# Patient Record
Sex: FEMALE | Race: WHITE | NOT HISPANIC OR LATINO | Employment: FULL TIME | URBAN - METROPOLITAN AREA
[De-identification: names, ages, dates, MRNs, and addresses within clinical notes are randomized per-mention and may not be internally consistent; named-entity substitution may affect disease eponyms.]

---

## 2018-01-24 ENCOUNTER — HISTORICAL (OUTPATIENT)
Dept: ADMINISTRATIVE | Facility: HOSPITAL | Age: 28
End: 2018-01-24

## 2018-01-24 LAB
ALBUMIN SERPL-MCNC: 3.8 GM/DL (ref 3.4–5)
ALBUMIN/GLOB SERPL: 1.1 RATIO (ref 1.1–2)
ALP SERPL-CCNC: 62 UNIT/L (ref 38–126)
ALT SERPL-CCNC: 23 UNIT/L (ref 12–78)
AST SERPL-CCNC: 15 UNIT/L (ref 15–37)
BILIRUB SERPL-MCNC: 0.5 MG/DL (ref 0.2–1)
BILIRUBIN DIRECT+TOT PNL SERPL-MCNC: 0.2 MG/DL (ref 0–0.5)
BILIRUBIN DIRECT+TOT PNL SERPL-MCNC: 0.3 MG/DL (ref 0–0.8)
BUN SERPL-MCNC: 11 MG/DL (ref 7–18)
CALCIUM SERPL-MCNC: 8.3 MG/DL (ref 8.5–10.1)
CHLORIDE SERPL-SCNC: 107 MMOL/L (ref 98–107)
CO2 SERPL-SCNC: 25 MMOL/L (ref 21–32)
CORTIS SERPL-SCNC: 15.7 MCG/DL
CREAT SERPL-MCNC: 0.84 MG/DL (ref 0.55–1.02)
GLOBULIN SER-MCNC: 3.4 GM/DL (ref 2.4–3.5)
GLUCOSE SERPL-MCNC: 100 MG/DL (ref 74–106)
POTASSIUM SERPL-SCNC: 4 MMOL/L (ref 3.5–5.1)
PROT SERPL-MCNC: 7.2 GM/DL (ref 6.4–8.2)
SODIUM SERPL-SCNC: 140 MMOL/L (ref 136–145)
T4 FREE SERPL-MCNC: 0.95 NG/DL (ref 0.76–1.46)
TSH SERPL-ACNC: 1.7 MIU/ML (ref 0.36–3.74)

## 2018-03-23 LAB — RAPID GROUP A STREP (OHS): NEGATIVE

## 2019-10-14 LAB
HCT VFR BLD AUTO: 45.7 % (ref 37–47)
HGB BLD-MCNC: 14.4 GM/DL (ref 12–16)

## 2019-10-16 ENCOUNTER — HISTORICAL (OUTPATIENT)
Dept: SURGERY | Facility: HOSPITAL | Age: 29
End: 2019-10-16

## 2019-10-16 LAB — POC BETA-HCG (QUAL): NEGATIVE

## 2020-05-11 ENCOUNTER — HISTORICAL (OUTPATIENT)
Dept: LAB | Facility: HOSPITAL | Age: 30
End: 2020-05-11

## 2020-10-07 ENCOUNTER — HISTORICAL (OUTPATIENT)
Dept: ADMINISTRATIVE | Facility: HOSPITAL | Age: 30
End: 2020-10-07

## 2020-10-07 LAB
ABS NEUT (OLG): 6.8 X10(3)/MCL (ref 2.1–9.2)
ALBUMIN SERPL-MCNC: 4.6 GM/DL (ref 3.4–5)
ALBUMIN/GLOB SERPL: 1.84 {RATIO} (ref 1.5–2.5)
ALP SERPL-CCNC: 49 UNIT/L (ref 38–126)
ALT SERPL-CCNC: 16 UNIT/L (ref 7–52)
AST SERPL-CCNC: 19 UNIT/L (ref 15–37)
BILIRUB SERPL-MCNC: 0.4 MG/DL (ref 0.2–1)
BILIRUBIN DIRECT+TOT PNL SERPL-MCNC: 0.1 MG/DL (ref 0–0.5)
BILIRUBIN DIRECT+TOT PNL SERPL-MCNC: 0.3 MG/DL
BUN SERPL-MCNC: 8 MG/DL (ref 7–18)
CALCIUM SERPL-MCNC: 9.4 MG/DL (ref 8.5–10)
CHLORIDE SERPL-SCNC: 105 MMOL/L (ref 98–107)
CO2 SERPL-SCNC: 23 MMOL/L (ref 21–32)
CREAT SERPL-MCNC: 0.76 MG/DL (ref 0.6–1.3)
ERYTHROCYTE [DISTWIDTH] IN BLOOD BY AUTOMATED COUNT: 13 % (ref 11.5–17)
GLOBULIN SER-MCNC: 2.5 GM/DL (ref 1.2–3)
GLUCOSE SERPL-MCNC: 98 MG/DL (ref 74–106)
HCT VFR BLD AUTO: 45 % (ref 37–47)
HGB BLD-MCNC: 14.7 GM/DL (ref 12–16)
LYMPHOCYTES # BLD AUTO: 2.4 X10(3)/MCL (ref 0.6–3.4)
LYMPHOCYTES NFR BLD AUTO: 24.3 % (ref 13–40)
MCH RBC QN AUTO: 29.6 PG (ref 27–31.2)
MCHC RBC AUTO-ENTMCNC: 33 GM/DL (ref 32–36)
MCV RBC AUTO: 90 FL (ref 80–94)
MONOCYTES # BLD AUTO: 0.8 X10(3)/MCL (ref 0.1–1.3)
MONOCYTES NFR BLD AUTO: 8.4 % (ref 0.1–24)
NEUTROPHILS NFR BLD AUTO: 67.3 % (ref 47–80)
PLATELET # BLD AUTO: 156 X10(3)/MCL (ref 130–400)
PMV BLD AUTO: 10.8 FL (ref 9.4–12.4)
POTASSIUM SERPL-SCNC: 4.2 MMOL/L (ref 3.5–5.1)
PROT SERPL-MCNC: 7.1 GM/DL (ref 6.4–8.2)
RBC # BLD AUTO: 4.97 X10(6)/MCL (ref 4.2–5.4)
SODIUM SERPL-SCNC: 139 MMOL/L (ref 136–145)
TSH SERPL-ACNC: 1.01 MIU/ML (ref 0.35–4.94)
WBC # SPEC AUTO: 10 X10(3)/MCL (ref 4.5–11.5)

## 2021-07-09 ENCOUNTER — HISTORICAL (OUTPATIENT)
Dept: ADMINISTRATIVE | Facility: HOSPITAL | Age: 31
End: 2021-07-09

## 2021-07-09 LAB
SARS-COV-2 RNA RESP QL NAA+PROBE: NEGATIVE
SARS-COV-2 RNA RESP QL NAA+PROBE: NEGATIVE

## 2021-07-27 ENCOUNTER — HISTORICAL (OUTPATIENT)
Dept: ADMINISTRATIVE | Facility: HOSPITAL | Age: 31
End: 2021-07-27

## 2021-07-27 LAB — SARS-COV-2 RNA RESP QL NAA+PROBE: NEGATIVE

## 2021-10-13 ENCOUNTER — HISTORICAL (OUTPATIENT)
Dept: LAB | Facility: HOSPITAL | Age: 31
End: 2021-10-13

## 2021-10-13 LAB — SARS-COV-2 RNA RESP QL NAA+PROBE: DETECTED

## 2021-10-15 ENCOUNTER — HISTORICAL (OUTPATIENT)
Dept: ADMINISTRATIVE | Facility: HOSPITAL | Age: 31
End: 2021-10-15

## 2022-04-10 ENCOUNTER — HISTORICAL (OUTPATIENT)
Dept: ADMINISTRATIVE | Facility: HOSPITAL | Age: 32
End: 2022-04-10

## 2022-04-26 VITALS
HEIGHT: 67 IN | DIASTOLIC BLOOD PRESSURE: 60 MMHG | SYSTOLIC BLOOD PRESSURE: 126 MMHG | BODY MASS INDEX: 31.18 KG/M2 | OXYGEN SATURATION: 98 % | WEIGHT: 198.63 LBS

## 2022-05-03 NOTE — HISTORICAL [facility] CERNER
This is a historical note converted from Dg. Formatting and pictures may have been removed.  Please reference Dg for original formatting and attached multimedia. Chief Complaint  Extreme fatigue. ?Noticed a lump on her neck this am.  History of Present Illness  Patient presents today for?fatigue. ?She complains of?ongoing?daily fatigue?x2 months. ?She admits to taking?a 1 hour nap daily due to extreme fatigue?after work.? She states in the morning 1 hour after awakening, she has fatigue. ?The fatigue rigors on throughout the day?and is?heightened in the afternoons when she gets off work at 4 PM.? She admits to sleeping well at night.? She also complains of?a shortened menstrual cycle.? She states for years she was a 28-day cycle?female, but the last 2 cycles have?been 24 to 25 days in length.? She states she has heavy menstrual?bleeding?and cramping?which is also started over the last 2 months as well.? She admits to a few endometriosis surgeries?and has a history of infertility.? She had labs completed 2 years ago?with a normal thyroid.? She also complains of a itchy rash to her?midline neck for 1 day.??She has very sensitive skin and has?a history of eczema.? She has multiple steroid creams, but denies applying to the neck today. ?She also?is nervous?about her thyroid due to thinking she is?feeling a protrusion when she?lifts her chin up.? She denies chest pain, shortness of breath, recent virus or bacterial infection,?sore throat,?fever, chills, body aches, weight loss, night sweats,?headaches, weakness, dizziness, back pain, neck pain,?hair loss, insomnia, anxiety, depression.  Review of Systems  GENERAL: No weight loss, no?weight gain, no fever, +fatigue, no chills, no night sweats  HEENT: No sore throat, no ear pain, no sinus pressure, no nasal congestion, no rhinorrhea, no decreased hearing  CARDIAC: No chest pain, no palpitations, no dyspnea on exertion, no orthopnea  RESPIRATORY: No cough, no  wheezing, no sputum production, no?SOB  GI: No abdominal pain, no N/V, no constipation, no diarrhea, no blood in stool  : No dysuria, no hematuria, no frequency, no urgency, no incontinence, no vaginal discharge or abnormal vaginal bleeding, +24 day menstrual cycles  SKIN: +rashes, no hives, no itching, no sores  ?  Physical Exam  Vitals & Measurements  T:?37.3? ?C (Oral)? HR:?92(Peripheral)? BP:?130/50? SpO2:?98%?  HT:?170.00?cm? WT:?90.500?kg? BMI:?31.31? LMP:?09/20/2020 00:00 CDT?  General: Well developed, well nourished in no apparent distress, alert and oriented x3  Skin:?+erythemic dermatitis to anterior neck  Neck:?FROM, no LAD, no thyroid abnormalities?palpable  Chest:?CTA?bilaterally, no wheezes crackles or rubs  Cardiac: RRR,?no murmurs, rubs, gallops  Assessment/Plan  1.?Fatigue?R53.83  CBC, CMP, TSH, Marion Barr virus titers ordered. ?  Start multivitamin.  Increase hydration, follow healthy diet, be physically active.  Ordered:  CBC w/ Auto Diff, Routine collect, 10/07/20 15:42:00 CDT, Blood, Order for future visit, Stop date 10/07/20 15:42:00 CDT, Lab Collect, Fatigue  Menorrhagia with irregular cycle, 10/07/20 15:42:00 CDT  Comprehensive Metabolic Panel, Routine collect, 10/07/20 15:42:00 CDT, Blood, Order for future visit, Stop date 10/07/20 15:42:00 CDT, Lab Collect, Fatigue  Menorrhagia with irregular cycle, 10/07/20 15:42:00 CDT  Marion-Barr Virus (EBV) Acute Infection Abs Profile-[facility] [id number], Routine collect, 10/07/20 15:43:00 CDT, Blood, Order for future visit, Stop date 10/07/20 15:43:00 CDT, Lab Collect, Fatigue  Menorrhagia with irregular cycle, 10/07/20 15:43:00 CDT  Lab Collection Request, 10/07/20 15:42:00 CDT, [facility] - [facility], 10/07/20 15:42:00 CDT, Fatigue  Menorrhagia with irregular cycle  Office/Outpatient Visit Level 3 Established [id number] PC, Fatigue  Menorrhagia with irregular cycle  Eczema, [facility], 10/07/20 15:43:00 CDT  Thyroid Stimulating Hormone, Routine  collect, 10/07/20 15:42:00 CDT, Blood, Order for future visit, Stop date 10/07/20 15:42:00 CDT, Lab Collect, Fatigue  Menorrhagia with irregular cycle, 10/07/20 15:42:00 CDT  ?  2.?Menorrhagia with irregular cycle?N92.1  CBC, TSH ordered.  Continue menstrual diary tracking.  FU with GYN--Rolando CARRILLO.?  LMP?9/24/2020.  Ordered:  CBC w/ Auto Diff, Routine collect, 10/07/20 15:42:00 CDT, Blood, Order for future visit, Stop date 10/07/20 15:42:00 CDT, Lab Collect, Fatigue  Menorrhagia with irregular cycle, 10/07/20 15:42:00 CDT  Comprehensive Metabolic Panel, Routine collect, 10/07/20 15:42:00 CDT, Blood, Order for future visit, Stop date 10/07/20 15:42:00 CDT, Lab Collect, Fatigue  Menorrhagia with irregular cycle, 10/07/20 15:42:00 CDT  Marion-Barr Virus (EBV) Acute Infection Abs Profile-[facility] [id number], Routine collect, 10/07/20 15:43:00 CDT, Blood, Order for future visit, Stop date 10/07/20 15:43:00 CDT, Lab Collect, Fatigue  Menorrhagia with irregular cycle, 10/07/20 15:43:00 CDT  Lab Collection Request, 10/07/20 15:42:00 CDT, [facility], 10/07/20 15:42:00 CDT, Fatigue  Menorrhagia with irregular cycle  Office/Outpatient Visit Level 3 Established [id number] PC, Fatigue  Menorrhagia with irregular cycle  Eczema, [facility], 10/07/20 15:43:00 CDT  Thyroid Stimulating Hormone, Routine collect, 10/07/20 15:42:00 CDT, Blood, Order for future visit, Stop date 10/07/20 15:42:00 CDT, Lab Collect, Fatigue  Menorrhagia with irregular cycle, 10/07/20 15:42:00 CDT  ?  3.?Eczema?L30.9  Has steroid RX for eczema--informed patient to apply thin layer to neck for itching and dermatitis.  Ordered:  Office/Outpatient Visit Level 3 Established [id number] PC, Fatigue  Menorrhagia with irregular cycle  Eczema, [facility], 10/07/20 15:43:00 CDT  ?  Orders:  Clinic Follow-up PRN, 10/07/20 15:43:00 CDT, [facility] - [facility], Future Order  Referrals  Clinic Follow-up PRN, 10/07/20 15:43:00 CDT, [facility] - [facility], Future Order    Problem List/Past Medical History  Ongoing  Factor 5 Leiden mutation  Obesity  Historical  Endometriosis  Migraine  PE - Pulmonary embolism  Procedure/Surgical History  Chromotubation of oviduct, including materials (10/16/2019)  Destruction of Bilateral Ovaries, Percutaneous Endoscopic Approach (10/16/2019)  Destruction of Cul-de-sac, Percutaneous Endoscopic Approach (10/16/2019)  Excision of Uterine Supporting Structure, Percutaneous Endoscopic Approach (10/16/2019)  Introduction of Other Diagnostic Substance into Female Reproductive, Via Natural or Artificial Opening Endoscopic (10/16/2019)  Laparoscopy (None) (10/16/2019)  Laparoscopy, surgical; with fulguration or excision of lesions of the ovary, pelvic viscera, or peritoneal surface by any method (10/16/2019)  Lysis Of Adhesions (10/16/2019)  bunionectomy left foot  laparoscopy  wisdom teeth   Medications  aspirin 81 mg oral Delayed Release (EC) tablet, 81 mg= 1 tab(s), Oral, Daily  Imitrex 6 mg/0.5 mL subcutaneous solution, 6 mg= 0.5 mL, Subcutaneous, Daily, PRN, 2 refills  progesterone 100 mg oral capsule, 400 mg= 4 cap(s), Oral, Daily  Allergies  Adhesive Bandage?(Redness)  HYDROcodone?(Vomiting, Rash)  Social History  Abuse/Neglect  No, 02/11/2020  No, 10/16/2019  No, 10/12/2019  Alcohol  Current, 1-2 times per month, 12/10/2018  Employment/School  Employed, Work/School description: [employer]., 12/10/2018  Exercise  Exercise frequency: Daily., 12/10/2018  Home/Environment  Living situation: Home/Independent., 12/10/2018  Nutrition/Health  Regular, Caffeine intake amount: 3 CUPS OF COFFEE PER DAY., 12/10/2018  Substance Use  Never, 12/10/2018  Tobacco  4 or less cigarettes(less than 1/4 pack)/day in last 30 days, Cigarettes, N/A, 02/11/2020  4 or less cigarettes(less than 1/4 pack)/day in last 30 days, N/A, 10/16/2019  Smoker, current status unknown, No, 10/12/2019  Current some day smoker, Cigarettes, No, 12/10/2018  Family History  Breast cancer:  Mother.  Health Maintenance  Health Maintenance  ???Pending?(in the next year)  ??? ??OverDue  ??? ? ? ?Influenza Vaccine due??10/01/19??and every 1??day(s)  ??? ? ? ?Smoking Cessation due??01/01/20??Variable frequency  ??? ? ? ?Alcohol Misuse Screening due??01/02/20??and every 1??year(s)  ??? ??Due?  ??? ? ? ?TB Skin Test due??08/14/20??and every 1??year(s)  ??? ? ? ?ADL Screening due??10/07/20??and every 1??year(s)  ??? ? ? ?Depression Screening due??10/07/20??and every?  ??? ? ? ?Tetanus Vaccine due??10/07/20??and every 10??year(s)  ??? ??Due In Future?  ??? ? ? ?Obesity Screening not due until??01/01/21??and every 1??year(s)  ???Satisfied?(in the past 1 year)  ??? ??Satisfied?  ??? ? ? ?Blood Pressure Screening on??10/07/20.??Satisfied by Tran Proctor LPN  ??? ? ? ?Body Mass Index Check on??10/07/20.??Satisfied by Tran Proctor LPN  ??? ? ? ?Cervical Cancer Screening on??02/10/20.??Satisfied by Kendy Bolaños  ??? ? ? ?Obesity Screening on??10/07/20.??Satisfied by Tran Proctor LPN  ?      Patients condition discussed the development nurse practitioner.?  I have reviewed and agree with plan of care and follow-up.

## 2022-08-17 ENCOUNTER — CLINICAL SUPPORT (OUTPATIENT)
Dept: URGENT CARE | Facility: CLINIC | Age: 32
End: 2022-08-17

## 2022-08-17 DIAGNOSIS — J02.9 SORE THROAT: ICD-10-CM

## 2022-08-17 DIAGNOSIS — J02.9 SORE THROAT: Primary | ICD-10-CM

## 2022-08-17 LAB
CTP QC/QA: YES
SARS-COV-2 RDRP RESP QL NAA+PROBE: POSITIVE

## 2022-08-17 PROCEDURE — U0002: ICD-10-PCS | Mod: QW,,, | Performed by: INTERNAL MEDICINE

## 2022-08-17 PROCEDURE — U0002 COVID-19 LAB TEST NON-CDC: HCPCS | Mod: QW,,, | Performed by: INTERNAL MEDICINE

## 2022-08-17 NOTE — PROGRESS NOTES
Employee presents to clinic for employee COVID swab. COVID test results were positive. Patient given results and employee protocol and expressed understanding.

## 2022-09-16 ENCOUNTER — OFFICE VISIT (OUTPATIENT)
Dept: NEUROLOGY | Facility: CLINIC | Age: 32
End: 2022-09-16
Payer: COMMERCIAL

## 2022-09-16 VITALS
HEIGHT: 67 IN | BODY MASS INDEX: 29.82 KG/M2 | SYSTOLIC BLOOD PRESSURE: 136 MMHG | WEIGHT: 190 LBS | DIASTOLIC BLOOD PRESSURE: 90 MMHG

## 2022-09-16 DIAGNOSIS — G43.709 CHRONIC MIGRAINE WITHOUT AURA WITHOUT STATUS MIGRAINOSUS, NOT INTRACTABLE: Primary | ICD-10-CM

## 2022-09-16 DIAGNOSIS — G43.709 CHRONIC MIGRAINE W/O AURA W/O STATUS MIGRAINOSUS, NOT INTRACTABLE: Primary | ICD-10-CM

## 2022-09-16 PROCEDURE — 3075F PR MOST RECENT SYSTOLIC BLOOD PRESS GE 130-139MM HG: ICD-10-PCS | Mod: CPTII,S$GLB,, | Performed by: PSYCHIATRY & NEUROLOGY

## 2022-09-16 PROCEDURE — 99205 PR OFFICE/OUTPT VISIT, NEW, LEVL V, 60-74 MIN: ICD-10-PCS | Mod: S$GLB,,, | Performed by: PSYCHIATRY & NEUROLOGY

## 2022-09-16 PROCEDURE — 1160F PR REVIEW ALL MEDS BY PRESCRIBER/CLIN PHARMACIST DOCUMENTED: ICD-10-PCS | Mod: CPTII,S$GLB,, | Performed by: PSYCHIATRY & NEUROLOGY

## 2022-09-16 PROCEDURE — 99205 OFFICE O/P NEW HI 60 MIN: CPT | Mod: S$GLB,,, | Performed by: PSYCHIATRY & NEUROLOGY

## 2022-09-16 PROCEDURE — 1160F RVW MEDS BY RX/DR IN RCRD: CPT | Mod: CPTII,S$GLB,, | Performed by: PSYCHIATRY & NEUROLOGY

## 2022-09-16 PROCEDURE — 1159F PR MEDICATION LIST DOCUMENTED IN MEDICAL RECORD: ICD-10-PCS | Mod: CPTII,S$GLB,, | Performed by: PSYCHIATRY & NEUROLOGY

## 2022-09-16 PROCEDURE — 99999 PR PBB SHADOW E&M-EST. PATIENT-LVL III: ICD-10-PCS | Mod: PBBFAC,,, | Performed by: PSYCHIATRY & NEUROLOGY

## 2022-09-16 PROCEDURE — 1159F MED LIST DOCD IN RCRD: CPT | Mod: CPTII,S$GLB,, | Performed by: PSYCHIATRY & NEUROLOGY

## 2022-09-16 PROCEDURE — 3008F PR BODY MASS INDEX (BMI) DOCUMENTED: ICD-10-PCS | Mod: CPTII,S$GLB,, | Performed by: PSYCHIATRY & NEUROLOGY

## 2022-09-16 PROCEDURE — 3075F SYST BP GE 130 - 139MM HG: CPT | Mod: CPTII,S$GLB,, | Performed by: PSYCHIATRY & NEUROLOGY

## 2022-09-16 PROCEDURE — 3008F BODY MASS INDEX DOCD: CPT | Mod: CPTII,S$GLB,, | Performed by: PSYCHIATRY & NEUROLOGY

## 2022-09-16 PROCEDURE — 3080F PR MOST RECENT DIASTOLIC BLOOD PRESSURE >= 90 MM HG: ICD-10-PCS | Mod: CPTII,S$GLB,, | Performed by: PSYCHIATRY & NEUROLOGY

## 2022-09-16 PROCEDURE — 3080F DIAST BP >= 90 MM HG: CPT | Mod: CPTII,S$GLB,, | Performed by: PSYCHIATRY & NEUROLOGY

## 2022-09-16 PROCEDURE — 99999 PR PBB SHADOW E&M-EST. PATIENT-LVL III: CPT | Mod: PBBFAC,,, | Performed by: PSYCHIATRY & NEUROLOGY

## 2022-09-16 RX ORDER — PROGESTERONE 200 MG/1
200 CAPSULE ORAL
COMMUNITY
Start: 2022-09-07 | End: 2024-04-02

## 2022-09-16 RX ORDER — SERTRALINE HYDROCHLORIDE 50 MG/1
1 TABLET, FILM COATED ORAL DAILY
COMMUNITY
Start: 2021-10-15 | End: 2022-10-18

## 2022-09-16 RX ORDER — FREMANEZUMAB-VFRM 225 MG/1.5ML
225 INJECTION SUBCUTANEOUS
Qty: 1 EACH | Refills: 6 | Status: SHIPPED | OUTPATIENT
Start: 2022-09-16 | End: 2023-12-13

## 2022-09-16 RX ORDER — ZOLMITRIPTAN 5 MG/1
1 SPRAY NASAL ONCE AS NEEDED
Qty: 1 EACH | Refills: 6 | Status: SHIPPED | OUTPATIENT
Start: 2022-09-16 | End: 2024-01-23

## 2022-09-16 NOTE — PROGRESS NOTES
Neurology Office Visit  Neurology    Daryl Musa is a 32 y.o. female for NP hernan.  Migraines since 11 yo.  Currently >15 headache days / month.  Has tried Topamax, Zoloft, Nurtec, Imitrex, NSAIDs.      ROS:  Review of Systems   All other systems reviewed and are negative.     Past Medical History:   Diagnosis Date    Factor 5 Leiden mutation, heterozygous     Headache     Other pulmonary embolism without acute cor pulmonale      History reviewed. No pertinent surgical history.  History reviewed. No pertinent family history.  Review of patient's allergies indicates:   Allergen Reactions    Norco [hydrocodone-acetaminophen] Hives       Current Outpatient Medications:     sertraline (ZOLOFT) 50 MG tablet, Take 1 tablet by mouth Daily., Disp: , Rfl:     aspirin 81 mg Cap, Take 1 capsule by mouth Daily., Disp: , Rfl:     progesterone (PROMETRIUM) 200 MG capsule, Take 200 mg by mouth every 14 (fourteen) days., Disp: , Rfl:   Outpatient Medications Marked as Taking for the 9/16/22 encounter (Office Visit) with Malik Morrow MD   Medication Sig Dispense Refill    sertraline (ZOLOFT) 50 MG tablet Take 1 tablet by mouth Daily.       Social History     Tobacco Use    Smoking status: Never    Smokeless tobacco: Never   Substance Use Topics    Alcohol use: Never    Drug use: Never         Vitals:    09/16/22 0908   BP: (!) 136/90     Gen NAD  HEENT NC/AT  CV RRR, no carotid bruits  Resp clear  GI soft  Ext no C/C/E  Neuro  AAOx4  Speech fluent, appropriate  EOMI, PERRLA, VFF, no papilledema  Normal facial strength, sensation  Tongue and palate midline  Motor 5/5  Sensation intact  DTRs symmetric  Coord intact  Gait Normal    Assessment: Chronic migraine    Plan: Botox

## 2022-09-19 ENCOUNTER — HISTORICAL (OUTPATIENT)
Dept: ADMINISTRATIVE | Facility: HOSPITAL | Age: 32
End: 2022-09-19
Payer: COMMERCIAL

## 2022-10-05 ENCOUNTER — TELEPHONE (OUTPATIENT)
Dept: NEUROLOGY | Facility: CLINIC | Age: 32
End: 2022-10-05
Payer: COMMERCIAL

## 2022-10-05 NOTE — TELEPHONE ENCOUNTER
Spoke to patient regarding more information regarding botox denial. Patient reports she is having greater than 15 migraine days a month. States migraines will last 4 hours even with medication. Has tried Ajovy, Topiramate, Nurtec, Zoloft, Imitrex and NSAIDS with no improvement in migraines. Has had migraines since the age of 10. Reports blurred vision and mild nausea with headache. Also reports difficulty keeping eyes opened due to pain with migraine. States will often have to lie down in a cool dark room with eye on her eyes for multiple hours to relieve headache. Will call for peer to peer to get botox approved.

## 2022-10-13 ENCOUNTER — TELEPHONE (OUTPATIENT)
Dept: NEUROLOGY | Facility: CLINIC | Age: 32
End: 2022-10-13
Payer: COMMERCIAL

## 2022-10-13 NOTE — TELEPHONE ENCOUNTER
Spoke with Dr. Stover-Sayed with [facility] peer to peer services. Patient's botox is denied even with updated clinicals. Patient will need to try either a TCA (such as Nortriptyline or Amitriptyline nightly) or a betablocker (propranolol) for a month before able to resubmit for botox approval.     I can order either per patient's preference. Blood pressure was slightly elevated at office visit so beta blocker may be beneficial.

## 2022-10-13 NOTE — TELEPHONE ENCOUNTER
Ok. Thanks. Will need Dr. Morrow to re-order Botox once we receive those records because the appeal case has been closed.

## 2022-11-09 PROBLEM — Z00.00 ENCOUNTER FOR WELLNESS EXAMINATION IN ADULT: Status: ACTIVE | Noted: 2022-11-09

## 2022-11-09 PROBLEM — G43.909 MIGRAINE WITHOUT STATUS MIGRAINOSUS, NOT INTRACTABLE: Status: ACTIVE | Noted: 2022-11-09

## 2022-11-15 PROBLEM — F41.9 ANXIETY: Status: ACTIVE | Noted: 2022-11-15

## 2022-11-15 PROBLEM — Z72.0 TOBACCO USE: Status: ACTIVE | Noted: 2022-11-15

## 2022-11-15 PROBLEM — F32.A DEPRESSION: Status: ACTIVE | Noted: 2022-11-15

## 2022-11-15 PROBLEM — Z23 IMMUNIZATION DUE: Status: ACTIVE | Noted: 2022-11-15

## 2022-11-15 PROBLEM — Z91.09 ENVIRONMENTAL ALLERGIES: Status: ACTIVE | Noted: 2022-11-15

## 2022-11-15 PROBLEM — E66.9 OBESITY: Status: ACTIVE | Noted: 2022-11-15

## 2022-12-27 ENCOUNTER — OFFICE VISIT (OUTPATIENT)
Dept: URGENT CARE | Facility: CLINIC | Age: 32
End: 2022-12-27
Payer: COMMERCIAL

## 2022-12-27 VITALS
RESPIRATION RATE: 18 BRPM | WEIGHT: 195 LBS | BODY MASS INDEX: 30.61 KG/M2 | HEART RATE: 95 BPM | DIASTOLIC BLOOD PRESSURE: 74 MMHG | TEMPERATURE: 100 F | OXYGEN SATURATION: 99 % | HEIGHT: 67 IN | SYSTOLIC BLOOD PRESSURE: 119 MMHG

## 2022-12-27 DIAGNOSIS — R50.9 FEVER, UNSPECIFIED FEVER CAUSE: ICD-10-CM

## 2022-12-27 DIAGNOSIS — U07.1 COVID: Primary | ICD-10-CM

## 2022-12-27 LAB
CTP QC/QA: YES
CTP QC/QA: YES
POC MOLECULAR INFLUENZA A AGN: NEGATIVE
POC MOLECULAR INFLUENZA B AGN: NEGATIVE
SARS-COV-2 RDRP RESP QL NAA+PROBE: POSITIVE

## 2022-12-27 PROCEDURE — 99213 OFFICE O/P EST LOW 20 MIN: CPT | Mod: ,,, | Performed by: FAMILY MEDICINE

## 2022-12-27 PROCEDURE — 3074F PR MOST RECENT SYSTOLIC BLOOD PRESSURE < 130 MM HG: ICD-10-PCS | Mod: CPTII,,, | Performed by: FAMILY MEDICINE

## 2022-12-27 PROCEDURE — 1160F PR REVIEW ALL MEDS BY PRESCRIBER/CLIN PHARMACIST DOCUMENTED: ICD-10-PCS | Mod: CPTII,,, | Performed by: FAMILY MEDICINE

## 2022-12-27 PROCEDURE — 87502 INFLUENZA DNA AMP PROBE: CPT | Mod: QW,,, | Performed by: FAMILY MEDICINE

## 2022-12-27 PROCEDURE — 1159F PR MEDICATION LIST DOCUMENTED IN MEDICAL RECORD: ICD-10-PCS | Mod: CPTII,,, | Performed by: FAMILY MEDICINE

## 2022-12-27 PROCEDURE — 3008F PR BODY MASS INDEX (BMI) DOCUMENTED: ICD-10-PCS | Mod: CPTII,,, | Performed by: FAMILY MEDICINE

## 2022-12-27 PROCEDURE — 87502 POCT INFLUENZA A/B MOLECULAR: ICD-10-PCS | Mod: QW,,, | Performed by: FAMILY MEDICINE

## 2022-12-27 PROCEDURE — 87635: ICD-10-PCS | Mod: QW,,, | Performed by: FAMILY MEDICINE

## 2022-12-27 PROCEDURE — 87635 SARS-COV-2 COVID-19 AMP PRB: CPT | Mod: QW,,, | Performed by: FAMILY MEDICINE

## 2022-12-27 PROCEDURE — 99213 PR OFFICE/OUTPT VISIT, EST, LEVL III, 20-29 MIN: ICD-10-PCS | Mod: ,,, | Performed by: FAMILY MEDICINE

## 2022-12-27 PROCEDURE — 3008F BODY MASS INDEX DOCD: CPT | Mod: CPTII,,, | Performed by: FAMILY MEDICINE

## 2022-12-27 PROCEDURE — 3078F PR MOST RECENT DIASTOLIC BLOOD PRESSURE < 80 MM HG: ICD-10-PCS | Mod: CPTII,,, | Performed by: FAMILY MEDICINE

## 2022-12-27 PROCEDURE — 1160F RVW MEDS BY RX/DR IN RCRD: CPT | Mod: CPTII,,, | Performed by: FAMILY MEDICINE

## 2022-12-27 PROCEDURE — 3074F SYST BP LT 130 MM HG: CPT | Mod: CPTII,,, | Performed by: FAMILY MEDICINE

## 2022-12-27 PROCEDURE — 3078F DIAST BP <80 MM HG: CPT | Mod: CPTII,,, | Performed by: FAMILY MEDICINE

## 2022-12-27 PROCEDURE — 1159F MED LIST DOCD IN RCRD: CPT | Mod: CPTII,,, | Performed by: FAMILY MEDICINE

## 2022-12-27 RX ORDER — SUMATRIPTAN SUCCINATE 100 MG/1
TABLET ORAL
COMMUNITY
End: 2023-12-13

## 2022-12-27 NOTE — PROGRESS NOTES
"Subjective:       Patient ID: Daryl Musa is a 32 y.o. female.    Vitals:  height is 5' 7" (1.702 m) and weight is 88.5 kg (195 lb). Her temperature is 99.9 °F (37.7 °C). Her blood pressure is 119/74 and her pulse is 95. Her respiration is 18 and oxygen saturation is 99%.     Chief Complaint: Fever    32 y.o. female presents to clinic w/ c/o chills, body aches, dizziness, fever (high of 102.4), nausea, vomiting (resolved) and diarrhea x1d. Alleviating factors used include Tylenol w/ improvement.     Fever   Associated symptoms include diarrhea and vomiting.     Constitution: Positive for fever.   HENT: Negative.     Cardiovascular: Negative.    Eyes: Negative.    Respiratory: Negative.     Gastrointestinal:  Positive for vomiting and diarrhea.   Genitourinary: Negative.    Musculoskeletal: Negative.    Skin: Negative.    Allergic/Immunologic: Negative.    Neurological: Negative.    Hematologic/Lymphatic: Negative.      Objective:      Physical Exam   Constitutional: She is oriented to person, place, and time.  Non-toxic appearance. She does not appear ill. No distress.   HENT:   Head: Normocephalic and atraumatic.   Eyes: Conjunctivae are normal.   Pulmonary/Chest: Effort normal and breath sounds normal. No stridor. No respiratory distress. She has no wheezes. She has no rhonchi. She has no rales.   Abdominal: Normal appearance. She exhibits no distension. Soft. There is no abdominal tenderness. There is no rebound and no guarding.   Neurological: She is alert and oriented to person, place, and time.   Skin: Skin is not diaphoretic and no rash.   Psychiatric: Her behavior is normal. Mood, judgment and thought content normal.   Vitals reviewed.         Previous History      Review of patient's allergies indicates:   Allergen Reactions    Norco [hydrocodone-acetaminophen] Hives       Past Medical History:   Diagnosis Date    Anxiety     Factor 5 Leiden mutation, heterozygous      Current Outpatient Medications " "  Medication Instructions    AJOVY AUTOINJECTOR 225 mg, Subcutaneous, Every 28 days    ALPRAZolam (XANAX) 0.5 MG tablet   See Instructions, Take 1-2 tablets as needed for anxiety., # 20 tab(s), 0 Refill(s), Pharmacy: [facility]/pharmacy #[id number], 170, cm, Height/Length Dosing, 09/22/21 15:59:00 CDT, 90.1, kg, Weight Dosing, 09/22/21 15:59:00 CDT    aspirin 81 mg Cap 1 capsule, Oral, Daily    ibuprofen (ADVIL,MOTRIN) 800 MG tablet TAKE 1 TABLET BY MOUTH EVERY 6 TO 8 HOURS AS NEEDED FOR PAIN    naproxen sodium (ANAPROX) 550 mg, Oral, 2 times daily    nirmatrelvir-ritonavir 300 mg (150 mg x 2)-100 mg copackaged tablets (EUA) Take 3 tablets by mouth 2 (two) times daily. Each dose contains 2 nirmatrelvir (pink tablets) and 1 ritonavir (white tablet). Take all 3 tablets together    progesterone (PROMETRIUM) 200 mg, Oral, Every 14 days    sertraline (ZOLOFT) 50 mg, Oral, Daily    sumatriptan (IMITREX) 100 MG tablet Imitrex    topiramate (TOPAMAX) 50 MG tablet TAKE 1 TABLET BY MOUTH EVERY DAY    ZOLMitriptan (ZOMIG) 5 mg Spry 1 spray, Nasal, Once as needed     Past Surgical History:   Procedure Laterality Date    bunionectomy left foot Left     LAPAROSCOPY      LYSIS OF ADHESIONS  10/16/2019    MOUTH SURGERY      WISDOM TOOTH EXTRACTION       Family History   Problem Relation Age of Onset    Breast cancer Mother     No Known Problems Sister        Social History     Tobacco Use    Smoking status: Every Day     Packs/day: 0.25     Years: 10.00     Pack years: 2.50     Types: Cigarettes    Smokeless tobacco: Never   Substance Use Topics    Alcohol use: Yes     Comment: 1-2 times a month    Drug use: Never        Physical Exam      Vital Signs Reviewed   /74   Pulse 95   Temp 99.9 °F (37.7 °C)   Resp 18   Ht 5' 7" (1.702 m)   Wt 88.5 kg (195 lb)   LMP 12/10/2022   SpO2 99%   BMI 30.54 kg/m²        Procedures    Procedures     Labs     Results for orders placed or performed in visit on 12/27/22   POCT COVID-19 Rapid " Screening   Result Value Ref Range    POC Rapid COVID Positive (A) Negative     Acceptable Yes    POCT Influenza A/B MOLECULAR   Result Value Ref Range    POC Molecular Influenza A Ag Negative Negative, Not Reported    POC Molecular Influenza B Ag Negative Negative, Not Reported     Acceptable Yes        Assessment:       1. COVID    2. Fever, unspecified fever cause          Plan:         COVID Positive  Start vitamin C 1000mg twice a day, zinc 100mg once a day, and vitamin D3 at least 2000 units a day. Current CDC guidelines recommend that you quarantine for 5 days starting the day after your symptoms began. Quarantine can end after 5 days as long as the last 24 hours of quarantine you are fever free and there is improvement of all your symptoms. Wear a mask around others for 5 additional days after quarantine. Treat your symptoms as you would the common cold. If you live with anybody, isolate yourself in a separate bedroom and use a separate bathroom. If your symptoms worsen or you develop shortness of breath or a fever over 102.5 , seek medical attention immediately.     COVID    Fever, unspecified fever cause  -     POCT COVID-19 Rapid Screening  -     POCT Influenza A/B MOLECULAR    Other orders  -     nirmatrelvir-ritonavir 300 mg (150 mg x 2)-100 mg copackaged tablets (EUA); Take 3 tablets by mouth 2 (two) times daily. Each dose contains 2 nirmatrelvir (pink tablets) and 1 ritonavir (white tablet). Take all 3 tablets together  Dispense: 30 tablet; Refill: 0

## 2023-01-04 PROCEDURE — 85060 BLOOD SMEAR INTERPRETATION: CPT | Performed by: FAMILY MEDICINE

## 2023-02-13 PROBLEM — Z00.00 ENCOUNTER FOR WELLNESS EXAMINATION IN ADULT: Status: RESOLVED | Noted: 2022-11-09 | Resolved: 2023-02-13

## 2023-07-21 ENCOUNTER — OFFICE VISIT (OUTPATIENT)
Dept: URGENT CARE | Facility: CLINIC | Age: 33
End: 2023-07-21
Payer: COMMERCIAL

## 2023-07-21 VITALS
WEIGHT: 192 LBS | HEART RATE: 74 BPM | OXYGEN SATURATION: 98 % | HEIGHT: 67 IN | SYSTOLIC BLOOD PRESSURE: 129 MMHG | DIASTOLIC BLOOD PRESSURE: 78 MMHG | TEMPERATURE: 99 F | BODY MASS INDEX: 30.13 KG/M2 | RESPIRATION RATE: 18 BRPM

## 2023-07-21 DIAGNOSIS — J01.90 ACUTE SINUSITIS, RECURRENCE NOT SPECIFIED, UNSPECIFIED LOCATION: Primary | ICD-10-CM

## 2023-07-21 PROCEDURE — 99213 PR OFFICE/OUTPT VISIT, EST, LEVL III, 20-29 MIN: ICD-10-PCS | Mod: ,,, | Performed by: PHYSICIAN ASSISTANT

## 2023-07-21 PROCEDURE — 99213 OFFICE O/P EST LOW 20 MIN: CPT | Mod: ,,, | Performed by: PHYSICIAN ASSISTANT

## 2023-07-21 RX ORDER — AMOXICILLIN AND CLAVULANATE POTASSIUM 875; 125 MG/1; MG/1
1 TABLET, FILM COATED ORAL EVERY 12 HOURS
Qty: 20 TABLET | Refills: 0 | Status: SHIPPED | OUTPATIENT
Start: 2023-07-21 | End: 2023-07-31

## 2023-07-21 NOTE — PATIENT INSTRUCTIONS
Drink plenty of fluids.     Get plenty of rest.     Tylenol or Motrin as needed.     Go to the ER with any significant change or worsening of symptoms.     Follow up with your primary care doctor.     Continue prednisone. Oral antihistamine/decongestant.

## 2023-07-21 NOTE — PROGRESS NOTES
"Subjective:      Patient ID: Daryl Musa is a 33 y.o. female.    Vitals:  height is 5' 7" (1.702 m) and weight is 87.1 kg (192 lb). Her temperature is 98.5 °F (36.9 °C). Her blood pressure is 129/78 and her pulse is 74. Her respiration is 18 and oxygen saturation is 98%.     Chief Complaint: Nasal Congestion (Nasal congestion/pressure. Cough. - Entered by patient)     Patient is a 33 y.o. female who presents to urgent care with complaints of cough, sinus pressure, congestion, pressure in right ear x7 days. Patient start prednisone a few days ago which was prescribed via telemedicine visit and she is not seen any improvement in symptoms.  ROS   Objective:     Physical Exam   Constitutional: She is oriented to person, place, and time. She appears well-developed. She is cooperative.  Non-toxic appearance. She does not appear ill. No distress.   HENT:   Head: Normocephalic and atraumatic.   Ears:   Right Ear: Hearing, tympanic membrane, external ear and ear canal normal.   Left Ear: Hearing, tympanic membrane, external ear and ear canal normal.   Nose: Nose normal. No nasal deformity. No epistaxis.   Mouth/Throat: Uvula is midline, oropharynx is clear and moist and mucous membranes are normal. No trismus in the jaw. Normal dentition. No uvula swelling. No oropharyngeal exudate, posterior oropharyngeal edema or posterior oropharyngeal erythema.   Eyes: Conjunctivae and lids are normal. No scleral icterus.   Neck: Trachea normal and phonation normal. Neck supple. No edema present. No erythema present. No neck rigidity present.   Cardiovascular: Normal rate, regular rhythm, normal heart sounds and normal pulses.   Pulmonary/Chest: Effort normal and breath sounds normal. No respiratory distress. She has no decreased breath sounds. She has no rhonchi.   Abdominal: Normal appearance.   Musculoskeletal: Normal range of motion.         General: No deformity. Normal range of motion.   Neurological: She is alert and oriented " to person, place, and time. She exhibits normal muscle tone. Coordination normal.   Skin: Skin is warm, dry, intact, not diaphoretic and not pale.   Psychiatric: Her speech is normal and behavior is normal. Judgment and thought content normal.   Nursing note and vitals reviewed.       Previous History      Review of patient's allergies indicates:   Allergen Reactions    Norco [hydrocodone-acetaminophen] Hives       Past Medical History:   Diagnosis Date    Anxiety     Dyspareunia     Endometriosis, unspecified     Factor 5 Leiden mutation, heterozygous     Fibroid     Infertility, female     Migraine     Obesity, unspecified     Other pulmonary embolism without acute cor pulmonale      Current Outpatient Medications   Medication Instructions    AJOVY AUTOINJECTOR 225 mg, Subcutaneous, Every 28 days    ALPRAZolam (XANAX) 0.5 MG tablet   See Instructions, Take 1-2 tablets as needed for anxiety., # 20 tab(s), 0 Refill(s), Pharmacy: [facility]/pharmacy #[id number], 170, cm, Height/Length Dosing, 09/22/21 15:59:00 CDT, 90.1, kg, Weight Dosing, 09/22/21 15:59:00 CDT    amoxicillin-clavulanate 875-125mg (AUGMENTIN) 875-125 mg per tablet 1 tablet, Oral, Every 12 hours    aspirin 81 mg Cap 1 capsule, Oral, Daily    ibuprofen (ADVIL,MOTRIN) 800 MG tablet TAKE 1 TABLET BY MOUTH EVERY 6 TO 8 HOURS AS NEEDED FOR PAIN    naproxen sodium (ANAPROX) 550 mg, Oral, 2 times daily    nirmatrelvir-ritonavir 300 mg (150 mg x 2)-100 mg copackaged tablets (EUA) Take 3 tablets by mouth 2 (two) times daily. Each dose contains 2 nirmatrelvir (pink tablets) and 1 ritonavir (white tablet). Take all 3 tablets together    progesterone (PROMETRIUM) 200 mg, Oral, Every 14 days    pyrilamine-chlophedianoL 12.5-12.5 mg/5 mL Liqd 10 mLs, Oral, 3 times daily    sertraline (ZOLOFT) 50 mg, Oral, Daily    sumatriptan (IMITREX) 100 MG tablet Imitrex    topiramate (TOPAMAX) 50 MG tablet TAKE 1 TABLET BY MOUTH EVERY DAY    ZOLMitriptan (ZOMIG) 5 mg Spry 1 spray, Nasal,  "Once as needed     Past Surgical History:   Procedure Laterality Date    bunionectomy left foot Left     CERVICAL BIOPSY  W/ LOOP ELECTRODE EXCISION      ENDOMETRIAL ABLATION      LAPAROSCOPY  2019    Dx:Emosis    LYSIS OF ADHESIONS  10/16/2019    MOUTH SURGERY      WISDOM TOOTH EXTRACTION       Family History   Problem Relation Age of Onset    Breast cancer Mother     Migraines Mother     No Known Problems Sister        Social History     Tobacco Use    Smoking status: Former     Packs/day: 0.25     Years: 10.00     Pack years: 2.50     Types: Cigarettes     Quit date:      Years since quittin.5    Smokeless tobacco: Never   Substance Use Topics    Alcohol use: Not Currently     Comment: 1-2 times a month    Drug use: Never        Physical Exam      Vital Signs Reviewed   /78   Pulse 74   Temp 98.5 °F (36.9 °C)   Resp 18   Ht 5' 7" (1.702 m)   Wt 87.1 kg (192 lb)   LMP 2023   SpO2 98%   BMI 30.07 kg/m²        Procedures    Procedures     Labs     Results for orders placed or performed in visit on 23   Path Review, Peripheral Smear   Result Value Ref Range    Peripheral Smear Evaluation       The erythrocytes, granulocytes, monocytes, lymphocytes and platelets are unremarkable.    Nicho Vera M.D., Ph.D.    CBC with Differential   Result Value Ref Range    WBC 8.8 4.5 - 11.5 x10(3)/mcL    RBC 5.16 4.20 - 5.40 x10(6)/mcL    Hgb 15.4 12.0 - 16.0 gm/dL    Hct 46.4 37.0 - 47.0 %    MCV 89.9 80.0 - 94.0 fL    MCH 29.8 pg    MCHC 33.2 33.0 - 36.0 mg/dL    RDW 13.2 11.0 - 14.5 %    Platelet 171 140 - 371 x10(3)/mcL    MPV 10.9 9.4 - 12.4 fL    Neut % 67.4 %    Lymph % 24.0 %    Mono % 8.6 %    Lymph # 2.1 0.6 - 4.6 x10(3)/mcL    Neut # 5.9 2.1 - 9.2 x10(3)/mcL    Mono # 0.8 0.1 - 1.3 x10(3)/mcL     Assessment:     1. Acute sinusitis, recurrence not specified, unspecified location        Plan:       Acute sinusitis, recurrence not specified, unspecified location    Other orders  -     " amoxicillin-clavulanate 875-125mg (AUGMENTIN) 875-125 mg per tablet; Take 1 tablet by mouth every 12 (twelve) hours. for 10 days  Dispense: 20 tablet; Refill: 0  -     pyrilamine-chlophedianoL 12.5-12.5 mg/5 mL Liqd; Take 10 mLs by mouth 3 (three) times daily.  Dispense: 180 mL; Refill: 0    Drink plenty of fluids.     Get plenty of rest.     Tylenol or Motrin as needed.     Go to the ER with any significant change or worsening of symptoms.     Follow up with your primary care doctor.     Continue prednisone. Oral antihistamine/decongestant.

## 2024-01-23 ENCOUNTER — OFFICE VISIT (OUTPATIENT)
Dept: MATERNAL FETAL MEDICINE | Facility: CLINIC | Age: 34
End: 2024-01-23
Payer: COMMERCIAL

## 2024-01-23 ENCOUNTER — PROCEDURE VISIT (OUTPATIENT)
Dept: MATERNAL FETAL MEDICINE | Facility: CLINIC | Age: 34
End: 2024-01-23
Payer: COMMERCIAL

## 2024-01-23 VITALS
HEIGHT: 67 IN | HEART RATE: 89 BPM | BODY MASS INDEX: 33.03 KG/M2 | SYSTOLIC BLOOD PRESSURE: 120 MMHG | WEIGHT: 210.44 LBS | DIASTOLIC BLOOD PRESSURE: 63 MMHG

## 2024-01-23 DIAGNOSIS — O99.341 MENTAL DISORDER AFFECTING PREGNANCY IN FIRST TRIMESTER: ICD-10-CM

## 2024-01-23 DIAGNOSIS — O99.119 FACTOR V LEIDEN MUTATION AFFECTING PREGNANCY: ICD-10-CM

## 2024-01-23 DIAGNOSIS — Z36.89 ENCOUNTER FOR FETAL ANATOMIC SURVEY: ICD-10-CM

## 2024-01-23 DIAGNOSIS — O09.511 PRIMIGRAVIDA OF ADVANCED MATERNAL AGE IN FIRST TRIMESTER: Primary | ICD-10-CM

## 2024-01-23 DIAGNOSIS — Z36.89 ENCOUNTER FOR FETAL ANATOMIC SURVEY: Primary | ICD-10-CM

## 2024-01-23 DIAGNOSIS — D68.51 FACTOR V LEIDEN MUTATION AFFECTING PREGNANCY: ICD-10-CM

## 2024-01-23 PROCEDURE — 3008F BODY MASS INDEX DOCD: CPT | Mod: CPTII,S$GLB,, | Performed by: OBSTETRICS & GYNECOLOGY

## 2024-01-23 PROCEDURE — 3078F DIAST BP <80 MM HG: CPT | Mod: CPTII,S$GLB,, | Performed by: OBSTETRICS & GYNECOLOGY

## 2024-01-23 PROCEDURE — 1159F MED LIST DOCD IN RCRD: CPT | Mod: CPTII,S$GLB,, | Performed by: OBSTETRICS & GYNECOLOGY

## 2024-01-23 PROCEDURE — 76817 TRANSVAGINAL US OBSTETRIC: CPT | Mod: S$GLB,,, | Performed by: OBSTETRICS & GYNECOLOGY

## 2024-01-23 PROCEDURE — 3074F SYST BP LT 130 MM HG: CPT | Mod: CPTII,S$GLB,, | Performed by: OBSTETRICS & GYNECOLOGY

## 2024-01-23 PROCEDURE — 99204 OFFICE O/P NEW MOD 45 MIN: CPT | Mod: S$GLB,,, | Performed by: OBSTETRICS & GYNECOLOGY

## 2024-01-23 PROCEDURE — 1160F RVW MEDS BY RX/DR IN RCRD: CPT | Mod: CPTII,S$GLB,, | Performed by: OBSTETRICS & GYNECOLOGY

## 2024-01-23 RX ORDER — PYRIDOXINE HCL (VITAMIN B6) 25 MG
25 TABLET ORAL DAILY
COMMUNITY
End: 2024-06-13

## 2024-01-23 RX ORDER — CETIRIZINE HYDROCHLORIDE 10 MG/1
10 TABLET ORAL DAILY
COMMUNITY

## 2024-01-23 RX ORDER — ENOXAPARIN SODIUM 100 MG/ML
40 INJECTION SUBCUTANEOUS DAILY
Qty: 12 ML | Refills: 8 | Status: SHIPPED | OUTPATIENT
Start: 2024-01-23 | End: 2024-10-01

## 2024-01-23 NOTE — PROGRESS NOTES
MATERNAL-FETAL MEDICINE   CONSULT NOTE    Provider requesting consultation: Dr Marshall    SUBJECTIVE:     Ms. Daryl Musa is a 33 y.o.  female with IUP at 10w0d who is seen in consultation by MFM for evaluation and management of:  Problem   1. AMA in first trimester   2. Factor V Leiden mutation affecting pregnancy   3. Anxiety/depressionaffecting pregnancy in first trimester     Daryl is being referred for the age in which she will deliver. She has not completed biochemical screening as of yet. She plans to consider NIPT at her OB office.  She has a history of Factor V (heterozygous) and had a prior PE at the age of 17 while on OCPs. She has been started on Lovenox 30mg daily by her primary OB. She has never had any other events since that time.   She has a history of anxiety and depression. She is taking Zoloft 50mg daily and reports symptoms are well controlled on this regimen.   She had an isolated, elevated BP at her initial OB appt.   She and her  have been trying to conceive for 7 years with several failed IUIs, etc. They are nervous but happy to be here.  She is taking ASA 81mg.       Medication List with Changes/Refills   Current Medications    ASPIRIN 81 MG CAP    Take 1 capsule by mouth Daily.    CETIRIZINE (ZYRTEC) 10 MG TABLET    Take 10 mg by mouth once daily.    LOVENOX 30 MG/0.3 ML SYRG    Inject 30 mg into the skin Daily.    PRENATAL VIT NO.124/IRON/FOLIC (PRENATAL VITAMIN ORAL)    Take by mouth once daily.    PROGESTERONE (PROMETRIUM) 200 MG CAPSULE    Take 200 mg by mouth every 14 (fourteen) days.    PYRIDOXINE, VITAMIN B6, (VITAMIN B-6) 25 MG TAB    Take 25 mg by mouth once daily.    SERTRALINE (ZOLOFT) 50 MG TABLET    TAKE 1 TABLET BY MOUTH EVERY DAY    ZOLMITRIPTAN (ZOMIG) 5 MG SPRY    1 spray by Nasal route once as needed (As needed for migraines).       Review of patient's allergies indicates:   Allergen Reactions    Hydrocodone-acetaminophen Hives, Itching and Nausea And  "Vomiting    Hydrocodone Rash and Nausea And Vomiting       PMH:  Past Medical History:   Diagnosis Date    Anxiety     Dyspareunia     Endometriosis, unspecified     Factor 5 Leiden mutation, heterozygous     Fibroid     Infertility, female     Migraine     Obesity, unspecified     Other pulmonary embolism without acute cor pulmonale        PObHx:  OB History    Para Term  AB Living   1 0 0 0 0 0   SAB IAB Ectopic Multiple Live Births   0 0 0 0 0      # Outcome Date GA Lbr Real/2nd Weight Sex Delivery Anes PTL Lv   1 Current                PSH:  Past Surgical History:   Procedure Laterality Date    bunionectomy left foot Left     CERVICAL BIOPSY  W/ LOOP ELECTRODE EXCISION      LAPAROSCOPY      Dx:Emosis    LYSIS OF ADHESIONS  10/16/2019    MOUTH SURGERY      WISDOM TOOTH EXTRACTION         Family history:family history includes Breast cancer in her mother; Cancer in her mother; Migraines in her mother; No Known Problems in her sister.    Social history: reports that she quit smoking about 12 months ago. Her smoking use included cigarettes. She started smoking about 11 years ago. She has a 2.5 pack-year smoking history. She has never used smokeless tobacco. She reports that she does not currently use alcohol. She reports that she does not use drugs.    Genetic history: The patient denies any inherited genetic diseases or birth defects in herself or her partner's personal history or family.    Objective:   /63   Pulse 89   Ht 5' 7" (1.702 m)   Wt 95.5 kg (210 lb 6.9 oz)   LMP 2023 (Exact Date)   BMI 32.96 kg/m²     Ultrasound performed. See viewpoint for full ultrasound report.    A viable dia intrauterine pregnancy is visualized consistent with the given MIKALA.  Early anatomic survey reveals no abnormalities. Yolk sac is visible. Nabothian cyst is present in the cervix.    ASSESSMENT/PLAN:     33 y.o.  female with IUP at 10w0d     1. AMA in first trimester  The patient " was counseled regarding advanced maternal age. The risk of fetal aneuploidy increases with age. We reviewed these risks at length, and we discussed the screening options available for risk assessment for fetal aneuploidy during pregnancy.  We also discussed the available diagnostic tests (CVS and amniocentesis).  Patients of advanced age are also at increased risks of pregnancy complications, including miscarriage, preeclampsia, abnormalities with placentation, and  delivery.  These risks increase with increasing maternal age and comorbidities.      The patient received genetic counseling about screening options, including: Cell free DNA      Recommendations:        Consider low dose aspirin at 12-16 weeks for preeclampsia risk reduction   Targeted anatomical ultrasound at 18-20 weeks.     Consider delivery at 39-40 weeks, but no later than 40 weeks 6 days.   Fetal growth ultrasound at 32-34 weeks      2. Factor V Leiden mutation affecting pregnancy  Results from an inherited thrombophilia evaluation indicate that she is heterozygous for the Factor V Leiden mutation. Approximately 3-8% of the general population is heterozygous for the R506Q mutation in the Factor V gene. Factor V Leiden is characterized by poor anticoagulant response to activated protein C and an increased risk for venous thrombosis. Factor V Leiden is inactivated at a rate approximately 10 times slower than normal Factor V and persists longer in circulation, resulting in an increased thrombin generation and a mild hypercoagulable state.    We discussed the meaning of genes and mutations in depth. She is aware that one of her Factor V genes does not work the way that it should, which causes an increased risk for blood clots. We have discussed inheritance patterns with Factor V Leiden. Since she is heterozygous, there is a 50% chance that each of her children could inherit this change, and subsequently be at risk to develop thromboses when  they are adults. Factor V Leiden testing is not routinely offerred to children because this is not a disorder that would change their medical management during childhood, as the risks of treatment are greater than the risk of thrombosis.     She is understandably concerned about her health, as well as the health of this pregnancy. We discussed that during pregnancy there is a 7-16 fold increase in thrombotic risk in Factor V Leiden heterozygotes. Factor V Leiden heterozygotes and homozygotes have also been found to be at an increased risk for pregnancy loss, particularly during the 2nd and 3rd trimesters. There is also a controversial association between Factor V Leiden and pre-eclampsia, growth restriction, and placental abruption due to poor placental perfusion.  Although these risks are increased significantly over the general population, is should be noted that the risk for pregnancy related venous thrombosis in asymptomatic FVL heterozygotes is approximately 0.2%. In individuals homozygous for FVL, the risk for venous thrombosis is approximately 15%. It is important for patients who have Factor V Leiden to be tested for other inherited or acquired thrombophilias such as prothrombin, methylenetetrahydrofolate reductase (MTHFR), anti-cardiolipin, phospholipid dependent coagulation assays for lupus inhibitor, protein C activity, and protein S activity. If abnormal values or mutations were to be found in any of the above listed tests, the risk for thrombosis will increase.      Recommendations:  Prophylactic anticoagulation during the antepartum period/ prophylactic anticoagulation for 6 weeks postpartum  For patients with a history of PE, consider maternal echocardiogram to assess for right heart strain    We recommend use of LMWH/enoxaparin over unfractionated heparin If the patient is unable to fill this medication, or if there is a high likelihood for need to reverse this medication, unfractionated heparin  should be prescribed. She will continue anticoagulation until 6 weeks postpartum.  Prophylaxis:  For patients with a BMI =< 40, prescribe lovenox 40 mg daily    Thrombophilia evaluation  We recommend the following evaluation for inherited thrombophilia: Factor V Leiden mutation analysis, Prothrombin [id number] mutation analysis, evaluation for protein C deficiency (activity < 60% in pregnancy is suggestive), and evaluation of antithrombin III deficiency. Evaluation for protein S deficiency is not valid during pregnancy. Evaluation for protein C, protein S, and antithrombin deficiency is not valid during an acute thrombotic event or while on anticoagulation.    Peripartum Management  YARA hose/SCDs should be used while anticoagulation is interrupted.  Regardless of whether the patient is on prophylactic dose UFH or LMWH, the last dose should be 12 hours prior to neuraxial anesthesia. For this reason, we no longer recommend conversion to UFH at 36 weeks.     In patients with neuraxial anesthesia: prophylactic anticoagulation should not be initiated within 12 hours of neuraxial blockade or within 4 hours of epidural removal, whichever is later; therapeutic anticoagulation with LMWH should not be initiated within 24 hours of neuraxial blockade or within 4 hours of epidural removal, whichever is later.     Prophylactic anticoagulation (defer to timing related to neuraxial anesthesia)  After vaginal delivery: should be started no sooner than 6 hours   After  delivery: should be started no sooner than 12 hours      Breastfeeding is compatible with warfarin, UFH, and LMWH.      3. Anxiety/depressionaffecting pregnancy in first trimester  She reports a history of anxiety and depression. She is taking Zoloft 50mg daily. She reports improvement of symptoms with the utilization of this medication regimen. Discussed increased risk for peripartum and postpartum mood disorders. Precautions provided.      We discussed the  usage of SSRIs in pregnancy and the minimal risks associated with the fetus.      We have discussed the importance of optimization of mental health conditions during pregnancy in an effort to reduce the risk of postpartum mood disorders. We have discussed options for management including psychotherapy, counseling, cognitive behavioral therapy, exercise/yoga, journaling, and psychiatry services. She will notify our office if she is interested in being referred for any of the above.        FOLLOW UP:   F/u in 4 weeks for US/MFM visit    Isabella Christensen MD  Maternal Fetal Medicine

## 2024-01-23 NOTE — ASSESSMENT & PLAN NOTE
Results from an inherited thrombophilia evaluation indicate that she is heterozygous for the Factor V Leiden mutation. Approximately 3-8% of the general population is heterozygous for the R506Q mutation in the Factor V gene. Factor V Leiden is characterized by poor anticoagulant response to activated protein C and an increased risk for venous thrombosis. Factor V Leiden is inactivated at a rate approximately 10 times slower than normal Factor V and persists longer in circulation, resulting in an increased thrombin generation and a mild hypercoagulable state.    We discussed the meaning of genes and mutations in depth. She is aware that one of her Factor V genes does not work the way that it should, which causes an increased risk for blood clots. We have discussed inheritance patterns with Factor V Leiden. Since she is heterozygous, there is a 50% chance that each of her children could inherit this change, and subsequently be at risk to develop thromboses when they are adults. Factor V Leiden testing is not routinely offerred to children because this is not a disorder that would change their medical management during childhood, as the risks of treatment are greater than the risk of thrombosis.     She is understandably concerned about her health, as well as the health of this pregnancy. We discussed that during pregnancy there is a 7-16 fold increase in thrombotic risk in Factor V Leiden heterozygotes. Factor V Leiden heterozygotes and homozygotes have also been found to be at an increased risk for pregnancy loss, particularly during the 2nd and 3rd trimesters. There is also a controversial association between Factor V Leiden and pre-eclampsia, growth restriction, and placental abruption due to poor placental perfusion.  Although these risks are increased significantly over the general population, is should be noted that the risk for pregnancy related venous thrombosis in asymptomatic FVL heterozygotes is  approximately 0.2%. In individuals homozygous for FVL, the risk for venous thrombosis is approximately 15%. It is important for patients who have Factor V Leiden to be tested for other inherited or acquired thrombophilias such as prothrombin, methylenetetrahydrofolate reductase (MTHFR), anti-cardiolipin, phospholipid dependent coagulation assays for lupus inhibitor, protein C activity, and protein S activity. If abnormal values or mutations were to be found in any of the above listed tests, the risk for thrombosis will increase.      Recommendations:  Prophylactic anticoagulation during the antepartum period/ prophylactic anticoagulation for 6 weeks postpartum  For patients with a history of PE, consider maternal echocardiogram to assess for right heart strain    We recommend use of LMWH/enoxaparin over unfractionated heparin If the patient is unable to fill this medication, or if there is a high likelihood for need to reverse this medication, unfractionated heparin should be prescribed. She will continue anticoagulation until 6 weeks postpartum.  Prophylaxis:  For patients with a BMI =< 40, prescribe lovenox 40 mg daily    Thrombophilia evaluation  We recommend the following evaluation for inherited thrombophilia: Factor V Leiden mutation analysis, Prothrombin [id number] mutation analysis, evaluation for protein C deficiency (activity < 60% in pregnancy is suggestive), and evaluation of antithrombin III deficiency. Evaluation for protein S deficiency is not valid during pregnancy. Evaluation for protein C, protein S, and antithrombin deficiency is not valid during an acute thrombotic event or while on anticoagulation.    Peripartum Management  YARA hose/SCDs should be used while anticoagulation is interrupted.  Regardless of whether the patient is on prophylactic dose UFH or LMWH, the last dose should be 12 hours prior to neuraxial anesthesia. For this reason, we no longer recommend conversion to UFH at 36 weeks.      In patients with neuraxial anesthesia: prophylactic anticoagulation should not be initiated within 12 hours of neuraxial blockade or within 4 hours of epidural removal, whichever is later; therapeutic anticoagulation with LMWH should not be initiated within 24 hours of neuraxial blockade or within 4 hours of epidural removal, whichever is later.     Prophylactic anticoagulation (defer to timing related to neuraxial anesthesia)  After vaginal delivery: should be started no sooner than 6 hours   After  delivery: should be started no sooner than 12 hours      Breastfeeding is compatible with warfarin, UFH, and LMWH.

## 2024-01-23 NOTE — ASSESSMENT & PLAN NOTE
The patient was counseled regarding advanced maternal age. The risk of fetal aneuploidy increases with age. We reviewed these risks at length, and we discussed the screening options available for risk assessment for fetal aneuploidy during pregnancy.  We also discussed the available diagnostic tests (CVS and amniocentesis).  Patients of advanced age are also at increased risks of pregnancy complications, including miscarriage, preeclampsia, abnormalities with placentation, and  delivery.  These risks increase with increasing maternal age and comorbidities.      The patient received genetic counseling about screening options, including: Cell free DNA      Recommendations:        Consider low dose aspirin at 12-16 weeks for preeclampsia risk reduction   Targeted anatomical ultrasound at 18-20 weeks.     Consider delivery at 39-40 weeks, but no later than 40 weeks 6 days.   Fetal growth ultrasound at 32-34 weeks

## 2024-01-24 LAB
ABO + RH BLD: NORMAL
ANTIBODY SCREEN: NORMAL
C TRACH DNA SPEC QL NAA+PROBE: NORMAL
HBV SURFACE AG SERPL QL IA: NORMAL
HCV AB SERPL QL IA: NEGATIVE
HIV 1+2 AB+HIV1 P24 AG SERPL QL IA: NORMAL
N GONORRHOEA DNA SPEC QL NAA+PROBE: NORMAL
RUBV IGG SER QL: NORMAL
T PALLIDUM AB SER QL: NONREACTIVE

## 2024-01-29 DIAGNOSIS — D69.6 THROMBOCYTOPENIA AFFECTING PREGNANCY: Primary | ICD-10-CM

## 2024-01-29 DIAGNOSIS — O99.119 FACTOR V LEIDEN MUTATION AFFECTING PREGNANCY: ICD-10-CM

## 2024-01-29 DIAGNOSIS — D68.51 FACTOR V LEIDEN MUTATION AFFECTING PREGNANCY: ICD-10-CM

## 2024-01-29 DIAGNOSIS — Z86.711 HISTORY OF PULMONARY EMBOLISM: ICD-10-CM

## 2024-01-29 DIAGNOSIS — O99.119 THROMBOCYTOPENIA AFFECTING PREGNANCY: Primary | ICD-10-CM

## 2024-02-07 ENCOUNTER — OFFICE VISIT (OUTPATIENT)
Dept: HEMATOLOGY/ONCOLOGY | Facility: CLINIC | Age: 34
End: 2024-02-07
Payer: COMMERCIAL

## 2024-02-07 VITALS
SYSTOLIC BLOOD PRESSURE: 127 MMHG | HEIGHT: 67 IN | OXYGEN SATURATION: 98 % | DIASTOLIC BLOOD PRESSURE: 77 MMHG | BODY MASS INDEX: 33.64 KG/M2 | TEMPERATURE: 98 F | HEART RATE: 89 BPM | RESPIRATION RATE: 14 BRPM | WEIGHT: 214.31 LBS

## 2024-02-07 DIAGNOSIS — O99.119 FACTOR V LEIDEN MUTATION AFFECTING PREGNANCY: ICD-10-CM

## 2024-02-07 DIAGNOSIS — D68.51 FACTOR V LEIDEN MUTATION AFFECTING PREGNANCY: ICD-10-CM

## 2024-02-07 DIAGNOSIS — O99.119 THROMBOCYTOPENIA AFFECTING PREGNANCY: Primary | ICD-10-CM

## 2024-02-07 DIAGNOSIS — D69.6 THROMBOCYTOPENIA AFFECTING PREGNANCY: ICD-10-CM

## 2024-02-07 DIAGNOSIS — D69.6 THROMBOCYTOPENIA AFFECTING PREGNANCY: Primary | ICD-10-CM

## 2024-02-07 DIAGNOSIS — Z86.711 HISTORY OF PULMONARY EMBOLISM: ICD-10-CM

## 2024-02-07 DIAGNOSIS — O99.119 THROMBOCYTOPENIA AFFECTING PREGNANCY: ICD-10-CM

## 2024-02-07 LAB
BASOPHILS # BLD AUTO: 0.01 X10(3)/MCL
BASOPHILS NFR BLD AUTO: 0.1 %
EOSINOPHIL # BLD AUTO: 0.11 X10(3)/MCL (ref 0–0.9)
EOSINOPHIL NFR BLD AUTO: 1 %
ERYTHROCYTE [DISTWIDTH] IN BLOOD BY AUTOMATED COUNT: 13 % (ref 11.5–17)
HCT VFR BLD AUTO: 35.8 % (ref 37–47)
HGB BLD-MCNC: 11.9 G/DL (ref 12–16)
IMM GRANULOCYTES # BLD AUTO: 0.02 X10(3)/MCL (ref 0–0.04)
IMM GRANULOCYTES NFR BLD AUTO: 0.2 %
LYMPHOCYTES # BLD AUTO: 1.52 X10(3)/MCL (ref 0.6–4.6)
LYMPHOCYTES NFR BLD AUTO: 13.4 %
MCH RBC QN AUTO: 29.2 PG (ref 27–31)
MCHC RBC AUTO-ENTMCNC: 33.2 G/DL (ref 33–36)
MCV RBC AUTO: 87.7 FL (ref 80–94)
MONOCYTES # BLD AUTO: 0.65 X10(3)/MCL (ref 0.1–1.3)
MONOCYTES NFR BLD AUTO: 5.7 %
NEUTROPHILS # BLD AUTO: 9.04 X10(3)/MCL (ref 2.1–9.2)
NEUTROPHILS NFR BLD AUTO: 79.6 %
PLATELET # BLD AUTO: 85 X10(3)/MCL (ref 130–400)
PMV BLD AUTO: 11.5 FL (ref 7.4–10.4)
RBC # BLD AUTO: 4.08 X10(6)/MCL (ref 4.2–5.4)
WBC # SPEC AUTO: 11.35 X10(3)/MCL (ref 4.5–11.5)

## 2024-02-07 PROCEDURE — 3078F DIAST BP <80 MM HG: CPT | Mod: CPTII,S$GLB,, | Performed by: INTERNAL MEDICINE

## 2024-02-07 PROCEDURE — 3074F SYST BP LT 130 MM HG: CPT | Mod: CPTII,S$GLB,, | Performed by: INTERNAL MEDICINE

## 2024-02-07 PROCEDURE — 3008F BODY MASS INDEX DOCD: CPT | Mod: CPTII,S$GLB,, | Performed by: INTERNAL MEDICINE

## 2024-02-07 PROCEDURE — 36415 COLL VENOUS BLD VENIPUNCTURE: CPT | Performed by: INTERNAL MEDICINE

## 2024-02-07 PROCEDURE — 1159F MED LIST DOCD IN RCRD: CPT | Mod: CPTII,S$GLB,, | Performed by: INTERNAL MEDICINE

## 2024-02-07 PROCEDURE — 1160F RVW MEDS BY RX/DR IN RCRD: CPT | Mod: CPTII,S$GLB,, | Performed by: INTERNAL MEDICINE

## 2024-02-07 PROCEDURE — 99205 OFFICE O/P NEW HI 60 MIN: CPT | Mod: S$GLB,,, | Performed by: INTERNAL MEDICINE

## 2024-02-07 PROCEDURE — 85025 COMPLETE CBC W/AUTO DIFF WBC: CPT | Performed by: INTERNAL MEDICINE

## 2024-02-07 PROCEDURE — 99999 PR PBB SHADOW E&M-EST. PATIENT-LVL V: CPT | Mod: PBBFAC,,, | Performed by: INTERNAL MEDICINE

## 2024-02-07 NOTE — ASSESSMENT & PLAN NOTE
Results from an inherited thrombophilia evaluation indicate that she is heterozygous for the Factor V Leiden mutation. Approximately 3-8% of the general population is heterozygous for the R506Q mutation in the Factor V gene. Factor V Leiden is characterized by poor anticoagulant response to activated protein C and an increased risk for venous thrombosis. Factor V Leiden is inactivated at a rate approximately 10 times slower than normal Factor V and persists longer in circulation, resulting in an increased thrombin generation and a mild hypercoagulable state.    We discussed the meaning of genes and mutations in depth. She is aware that one of her Factor V genes does not work the way that it should, which causes an increased risk for blood clots. We have discussed inheritance patterns with Factor V Leiden. Since she is heterozygous, there is a 50% chance that each of her children could inherit this change, and subsequently be at risk to develop thromboses when they are adults. Factor V Leiden testing is not routinely offerred to children because this is not a disorder that would change their medical management during childhood, as the risks of treatment are greater than the risk of thrombosis.     She is understandably concerned about her health, as well as the health of this pregnancy. We discussed that during pregnancy there is a 7-16 fold increase in thrombotic risk in Factor V Leiden heterozygotes. Factor V Leiden heterozygotes and homozygotes have also been found to be at an increased risk for pregnancy loss, particularly during the 2nd and 3rd trimesters. There is also a controversial association between Factor V Leiden and pre-eclampsia, growth restriction, and placental abruption due to poor placental perfusion.  Although these risks are increased significantly over the general population, is should be noted that the risk for pregnancy related venous thrombosis in asymptomatic FVL heterozygotes is  approximately 0.2%. In individuals homozygous for FVL, the risk for venous thrombosis is approximately 15%. It is important for patients who have Factor V Leiden to be tested for other inherited or acquired thrombophilias such as prothrombin, methylenetetrahydrofolate reductase (MTHFR), anti-cardiolipin, phospholipid dependent coagulation assays for lupus inhibitor, protein C activity, and protein S activity. If abnormal values or mutations were to be found in any of the above listed tests, the risk for thrombosis will increase.      Recommendations:  Prophylactic anticoagulation during the antepartum period/ prophylactic anticoagulation for 6 weeks postpartum  For patients with a history of PE, consider maternal echocardiogram to assess for right heart strain    We recommend use of LMWH/enoxaparin over unfractionated heparin If the patient is unable to fill this medication, or if there is a high likelihood for need to reverse this medication, unfractionated heparin should be prescribed. She will continue anticoagulation until 6 weeks postpartum.  Prophylaxis:  For patients with a BMI =< 40, prescribe lovenox 40 mg daily    Peripartum Management  -YARA hose/SCDs should be used while anticoagulation is interrupted.  -Regardless of whether the patient is on prophylactic dose UFH or LMWH, the last dose should be 12 hours prior to neuraxial anesthesia. For this reason, we no longer recommend conversion to UFH at 36 weeks.   -In patients with neuraxial anesthesia: prophylactic anticoagulation should not be initiated within 12 hours of neuraxial blockade or within 4 hours of epidural removal, whichever is later.    Postpartum Management  -Prophylactic anticoagulation (defer to timing related to neuraxial anesthesia)  After vaginal delivery: should be started no sooner than 6 hours  After  delivery: should be started no sooner than 12 hours  -Breastfeeding is compatible with warfarin, UFH, and LMWH.

## 2024-02-07 NOTE — PROGRESS NOTES
Subjective:       Patient ID: Daryl Musa is a 33 y.o. female.    Chief Complaint: Other Misc (Pt reports no concerns today. 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  Factor V Leiden heterozygous mutation  History of bilateral pulmonary emboli at age 17 provoked by oral contraceptive pills.    Current Treatment:   Prophylactic Lovenox.     Treatment History:  Lovenox with bridge to warfarin, continued warfarin for 9 months in 2008.    HPI:  33-year-old female who was started on oral contraceptive pills in 2008 at the age of 17, she then developed bilateral pulmonary emboli.  At that time, she was checked for DVT, this was negative.  She underwent hypercoagulable workup that showed that she was heterozygous for factor 5 Leiden.  She was started on Lovenox and bridged to warfarin.  She then treated with warfarin for 9 months.  After that, she was done with therapy for a provoked blood clot.  In October of 2020, the patient had a CBC that was normal including a normal platelet count.  She was then diagnosed with COVID-19 on multiple occasions, including 08/17/2022.  On 11/17/2022, she had a CBC that revealed a platelet count of 78,000. Repeat on 11/29/2022 showed increase in her platelet count, however she still had thrombocytopenia with a platelet count of 108,000. The patient was on baby aspirin at that time, stopped taking baby aspirin and had a repeat CBC on 01/04/2023 that showed normalization of her platelet count.    Patient subsequently became pregnant, to her factor 5 Leiden heterozygous state, she was started on prophylactic Lovenox.  She  underwent blood work on 01/24/2024 that revealed a platelet count down to 84,000.  The rest of the CBC was within normal limits.  Due to her likely ITP along with a factor 5 Leiden heterozygous state, she was referred to Hematology.  I saw her on 02/07/2024.  At that visit, she stated that overall she was doing well and she had no major issues to discuss.   She denied any bleeding or bruising.    Interval History:   Initial consult note.      Past Medical History:   Diagnosis Date    Anxiety     Dyspareunia     Endometriosis, unspecified     Factor 5 Leiden mutation, heterozygous     Fibroid     Infertility, female     Migraine     Obesity, unspecified     Other pulmonary embolism without acute cor pulmonale       Past Surgical History:   Procedure Laterality Date    bunionectomy left foot Left     CERVICAL BIOPSY  W/ LOOP ELECTRODE EXCISION      LAPAROSCOPY      Dx:Emosis    LYSIS OF ADHESIONS  10/16/2019    MOUTH SURGERY      WISDOM TOOTH EXTRACTION       Social History     Socioeconomic History    Marital status:      Spouse name: melissa    Number of children: 0   Occupational History    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   Substance and Sexual Activity    Alcohol use: Not Currently     Comment: 1-2 times a month    Drug use: Never    Sexual activity: Yes     Partners: Male     Birth control/protection: None     Social Determinants of Health     Financial Resource Strain: Low Risk  (2023)    Overall Financial Resource Strain (CARDIA)     Difficulty of Paying Living Expenses: Not hard at all   Food Insecurity: No Food Insecurity (2023)    Hunger Vital Sign     Worried About Running Out of Food in the Last Year: Never true     Ran Out of Food in the Last Year: Never true   Transportation Needs: No Transportation Needs (2023)    PRAPARE - Transportation     Lack of Transportation (Medical): No     Lack of Transportation (Non-Medical): No   Physical Activity: Insufficiently Active (2023)    Exercise Vital Sign     Days of Exercise per Week: 3 days      Minutes of Exercise per Session: 40 min   Stress: No Stress Concern Present (12/11/2023)    [employer] [place] [employer] - Occupational Stress Questionnaire     Feeling of Stress : Not at all   Social Connections: Unknown (12/11/2023)    Social Connection and Isolation Panel [NHANES]     Frequency of Communication with Friends and Family: More than three times a week     Frequency of Social Gatherings with Friends and Family: Twice a week     Active Member of Clubs or Organizations: No     Attends Club or Organization Meetings: Patient declined     Marital Status:    Housing Stability: Low Risk  (12/11/2023)    Housing Stability Vital Sign     Unable to Pay for Housing in the Last Year: No     Number of Places Lived in the Last Year: 1     Unstable Housing in the Last Year: No      Family History   Problem Relation Age of Onset    Cancer Mother     Breast cancer Mother     Migraines Mother     No Known Problems Sister       Review of patient's allergies indicates:   Allergen Reactions    Hydrocodone-acetaminophen Hives, Itching and Nausea And Vomiting    Hydrocodone Rash and Nausea And Vomiting      Review of Systems   Constitutional:  Negative for appetite change and unexpected weight change.   HENT:  Negative for mouth sores.    Eyes:  Negative for visual disturbance.   Respiratory:  Negative for cough and shortness of breath.    Cardiovascular:  Negative for chest pain.   Gastrointestinal:  Negative for abdominal pain and diarrhea.   Genitourinary:  Positive for frequency.   Musculoskeletal:  Positive for back pain.   Integumentary:  Negative for rash.   Neurological:  Negative for headaches.   Hematological:  Negative for adenopathy.   Psychiatric/Behavioral:  The patient is not nervous/anxious.          Objective:      Physical Exam  Vitals reviewed. Exam conducted with a chaperone present.   Constitutional:       General: She is not in acute distress.     Appearance: Normal appearance.    HENT:      Head: Normocephalic and atraumatic.      Nose: Nose normal.      Mouth/Throat:      Mouth: Mucous membranes are moist.   Eyes:      Extraocular Movements: Extraocular movements intact.      Conjunctiva/sclera: Conjunctivae normal.   Cardiovascular:      Rate and Rhythm: Normal rate and regular rhythm.      Pulses: Normal pulses.      Heart sounds: Normal heart sounds.   Pulmonary:      Effort: Pulmonary effort is normal.      Breath sounds: Normal breath sounds.   Abdominal:      General: Bowel sounds are normal.      Palpations: Abdomen is soft.   Musculoskeletal:         General: No swelling. Normal range of motion.      Cervical back: Normal range of motion and neck supple.      Right lower leg: No edema.      Left lower leg: No edema.   Lymphadenopathy:      Cervical: No cervical adenopathy.   Skin:     General: Skin is warm and dry.   Neurological:      General: No focal deficit present.      Mental Status: She is alert and oriented to person, place, and time. Mental status is at baseline.   Psychiatric:         Mood and Affect: Mood normal.         Behavior: Behavior normal.         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  Factor V Leiden heterozygous mutation  History of bilateral pulmonary emboli at age 17 provoked by oral contraceptive pills.        Plan:       At this time, the patient has a factor 5 Leiden heterozygous mutation and likely ITP.    She was currently pregnant.    I agree with prophylactic Lovenox at current dose.    Her CBC today, 02/07/2024 revealed a platelet count of 85,000.    Plan to see her every 4 weeks until she was about 36 weeks' gestation.  We will then plan to see her once every 2 weeks.  If she ever needed treatment to get her platelet count above 80,000 (currently at goal) so that she could receive an epidural, we would discuss treating with high-dose steroids and IVIG.    Return to clinic in 4 weeks with same day  labs    Castro Dumont II, MD

## 2024-02-07 NOTE — PROGRESS NOTES
"Maternal Fetal Medicine follow up consult    SUBJECTIVE:     Daryl Musa is a 33 y.o.  female with IUP at 14w0d who is seen in follow up consultation by MFM.  Pregnancy complications include:   Problem   4. Thrombocytopenia affecting pregnancy   5. Pregnancy complicated by prior LEEP, antepartum, second trimester   2. AMA in second trimester   1. Factor V Leiden mutation affecting pregnancy   3. Anxiety/depression affecting pregnancy in second trimester     Daryl presents for routine follow up appointment.  Upon receipt of routine prenatal lab results after her last office visit here, thrombocytopenia was noted. She was referred to heme/onc and will be followed closely.   Has discontinued low dose aspirin daily. Lovenox 40mg once daily.  She desires NIPT to be drawn today.    Previous notes reviewed.   No changes to medical, surgical, family, social, or obstetric history.    Interval history since last MFM visit: see above    Medications:  Current Outpatient Medications   Medication Instructions    cetirizine (ZYRTEC) 10 mg, Oral, Daily    enoxaparin (LOVENOX) 40 mg, Subcutaneous, Daily    prenatal vit no.124/iron/folic (PRENATAL VITAMIN ORAL) Oral, Daily    progesterone (PROMETRIUM) 200 mg, Oral, Every 14 days    pyridoxine (vitamin B6) (VITAMIN B-6) 25 mg, Oral, Daily    sertraline (ZOLOFT) 50 mg, Oral       Care team members:  Dr Marshall - Primary OB     OBJECTIVE:   /68   Pulse 97   Ht 5' 7" (1.702 m)   Wt 95.8 kg (211 lb 3.2 oz)   LMP 2023 (Exact Date)   BMI 33.08 kg/m²     Ultrasound performed. See viewpoint for full ultrasound report.    A dia living IUP is identified, and the biometry is appropriate for established gestational age.    Early, limited anatomy appears normal. The placenta is anterior.        ASSESSMENT/PLAN:     33 y.o.  female with IUP at 14w0d    1. Factor V Leiden mutation affecting pregnancy  Results from an inherited thrombophilia evaluation " indicate that she is heterozygous for the Factor V Leiden mutation. Approximately 3-8% of the general population is heterozygous for the R506Q mutation in the Factor V gene. Factor V Leiden is characterized by poor anticoagulant response to activated protein C and an increased risk for venous thrombosis. Factor V Leiden is inactivated at a rate approximately 10 times slower than normal Factor V and persists longer in circulation, resulting in an increased thrombin generation and a mild hypercoagulable state.    We discussed the meaning of genes and mutations in depth. She is aware that one of her Factor V genes does not work the way that it should, which causes an increased risk for blood clots. We have discussed inheritance patterns with Factor V Leiden. Since she is heterozygous, there is a 50% chance that each of her children could inherit this change, and subsequently be at risk to develop thromboses when they are adults. Factor V Leiden testing is not routinely offerred to children because this is not a disorder that would change their medical management during childhood, as the risks of treatment are greater than the risk of thrombosis.     She is understandably concerned about her health, as well as the health of this pregnancy. We discussed that during pregnancy there is a 7-16 fold increase in thrombotic risk in Factor V Leiden heterozygotes. Factor V Leiden heterozygotes and homozygotes have also been found to be at an increased risk for pregnancy loss, particularly during the 2nd and 3rd trimesters. There is also a controversial association between Factor V Leiden and pre-eclampsia, growth restriction, and placental abruption due to poor placental perfusion.  Although these risks are increased significantly over the general population, is should be noted that the risk for pregnancy related venous thrombosis in asymptomatic FVL heterozygotes is approximately 0.2%. In individuals homozygous for FVL, the  risk for venous thrombosis is approximately 15%. It is important for patients who have Factor V Leiden to be tested for other inherited or acquired thrombophilias such as prothrombin, methylenetetrahydrofolate reductase (MTHFR), anti-cardiolipin, phospholipid dependent coagulation assays for lupus inhibitor, protein C activity, and protein S activity. If abnormal values or mutations were to be found in any of the above listed tests, the risk for thrombosis will increase.      Recommendations:  Prophylactic anticoagulation during the antepartum period/ prophylactic anticoagulation for 6 weeks postpartum  For patients with a history of PE, consider maternal echocardiogram to assess for right heart strain    We recommend use of LMWH/enoxaparin over unfractionated heparin If the patient is unable to fill this medication, or if there is a high likelihood for need to reverse this medication, unfractionated heparin should be prescribed. She will continue anticoagulation until 6 weeks postpartum.  Prophylaxis:  For patients with a BMI =< 40, prescribe lovenox 40 mg daily    Peripartum Management  -YARA hose/SCDs should be used while anticoagulation is interrupted.  -Regardless of whether the patient is on prophylactic dose UFH or LMWH, the last dose should be 12 hours prior to neuraxial anesthesia. For this reason, we no longer recommend conversion to UFH at 36 weeks.   -In patients with neuraxial anesthesia: prophylactic anticoagulation should not be initiated within 12 hours of neuraxial blockade or within 4 hours of epidural removal, whichever is later.    Postpartum Management  -Prophylactic anticoagulation (defer to timing related to neuraxial anesthesia)  After vaginal delivery: should be started no sooner than 6 hours  After  delivery: should be started no sooner than 12 hours  -Breastfeeding is compatible with warfarin, UFH, and LMWH.      2. AMA in second trimester  Previously counseled the patient on the  relationship between maternal age and fetal aneuploidy.  We discussed the risks and benefits of screening tests versus definitive genetic testing (amniocentesis). She was counseled about her specific age-related risk of fetal aneuploidy. We discussed the limitations of ultrasound in the definitive diagnosis of fetal aneuploidy.  I quoted the patient a 1 in 900 procedure related risk of fetal loss with genetic amniocentesis.  We also discussed cell free fetal DNA screening including the sensitivity and specificity of the test.  Her questions were answered and after today's consultation she did not want to pursue amniocentesis.  She did want to pursue NIPT - drawn today.   Additionally, we discussed the association between advanced maternal age (AMA) and preeclampsia, gestational diabetes, and fetal growth restriction.    Recommendations:  Detailed anatomic survey at 19-20 weeks    Follow-up fetal ultrasound at 32-34 weeks for interval fetal growth  Has discontinued d/t thrombocytopenia        3. Anxiety/depression affecting pregnancy in second trimester  She reports a history of anxiety and depression. She is taking Zoloft 50mg daily. She reports improvement of symptoms with the utilization of this medication regimen. Discussed increased risk for peripartum and postpartum mood disorders. Precautions provided.      We discussed the usage of SSRIs in pregnancy and the minimal risks associated with the fetus.      We have discussed the importance of optimization of mental health conditions during pregnancy in an effort to reduce the risk of postpartum mood disorders. We have discussed options for management including psychotherapy, counseling, cognitive behavioral therapy, exercise/yoga, journaling, and psychiatry services. She will notify our office if she is interested in being referred for any of the above.      4. Thrombocytopenia affecting pregnancy  Thrombocytopenia is a complex process leading to isolated  thrombocytopenia (platelet count < 100 K/?L in absence of other etiologies. ITP can be primary (acquired immune-mediated disorder) or secondary (associated with underlying disease or drug exposure). The patient was counseled regarding the risks of ITP in pregnancy which include but are not limited to: mild to moderate bleeding (cutaneous or mucosal bleeding/both), risk of anesthetic complications (epidural hematoma), maternal hemorrhage (risk is higher in women with severe ITP-platelet count < 50 K/?L), fetal thrombocytopenia, and  thrombocytopenia (25% risk). Maternal platelet count does not predict the risk of  thrombocytopenia at birth. Severe fetal thrombocytopenia resulting in severe hemorrhage complications is rare (less than 1%). The  platelet count usually nadirs within the first 2 weeks of life. Assessing for antiplatelet antibodies is not indicated as the presence or absence of these is not useful in clinical management.    24 (@ [facility]) - PLT 85K    Recommendations:  CBC and peripheral smear (monthly- will be obtained w/ hematologist)  Check CBC every 4 weeks; increase frequency of evaluation at 32 weeks to every 2 weeks, and weekly at 36 weeks and beyond  Continue care with Hematology/Oncology  If platelet count is > 50 K/?Land patient is asymptomatic, no treatment is indicated  Treatment is indicated:  For patients with a platelet count is < 30 K/?L   For patients who have symptomatic bleeding  For patients with a platelet count < 50 K/?L who are anticipated to undergo a  delivery  For patients with platelet count < 70 - 80 K/?L around the time of delivery to increase the likelihood of being able to safely receive neuraxial anesthesia   Anesthesia consultation is recommended around 30-32 weeks gestation  Platelet transfusion should be administered:  For patients with a platelet count < 30 K/?L and undergoing vaginal delivery  For patients experiencing  significant bleeding  For patients with a platelet count < 50 K/?L and undergoing  delivery  Treatment options:  First line: prednisone 0.5 - 2 mg/kg/day (response is usually seen within 4-14 days)/alternative is dexamethasone burst (40 mg POD daily x 4 days)  Stress dosed steroids are recommended at time of delivery in patients receiving prednisone ? 10 mg daily (or steroid equivalent) for ? 21 days   IVIG can be considered for refractory cases or when rapid increase is necessary in consultation with Hematology/Oncology  Splenectomy can be considered in extreme circumstances, if recommended by Hematology/Oncology (this is best accomplished in second trimester) - N/A  Ensure appropriate vaccinations (for pneumococcus, HIB, and meningococcus)  Avoid NSAIDs and aspirin (she has stopped ASA)  If candidate for anticoagulation (ie prophylactic lovenox), discuss with Hematology/Oncology - Dr Dumont aware of Ppx Lovenox for Factor V/hx VTE  Check CBC on admission to L&D; type and crossmatch for prbc and transfuse platelets, if appropriate, as above  Mode of delivery per obstetric indications; however, would avoid use of fetal scalp electrode and avoid operative vaginal delivery (particularly vacuum assisted)  Notify pediatrics of maternal condition after birth as  will need platelet count checked and circumcision of male infant may need to be deferred      5. Pregnancy complicated by prior LEEP, antepartum, second trimester  LEEP of the cervix has been associated with premature labor and  PROM. Most such deliveries are late  births (34-37 weeks), and many studies show no significant difference in the frequency of infants with birth weight <2500g.  Adverse outcomes may be associated with the  time interval from procedure to conception.    Cervical length measurement will be performed at the time of anatomy scan. Prophylactic cerclage placement solely for history of LEEP, has not been shown to  provide benefit, and it is not recommended.    Recommendations:  Baseline transvaginal ultrasound cervical length (TVU CL) at anatomy scan (19-20 weeks' gestation- scheduled at [facility])  If TVU CL ?30 mm, then routine prenatal care.  If TVU CL 25-29 mm, then:  Repeat TVU CL in 7-14 days, if unchanged then routine prenatal care. If <25 mm, then see bullets directly below.  If TVU CL <25 mm, then:  Consideration for micronized progesterone 200mg nightly as a vaginal suppository until 37w0d.  Repeat TVU CL every 1-2 weeks until 22w6d for possible further shortening      F/u in 4 weeks for [facility] visit/growth ultrasound    Isabella Christensen MD  Maternal Fetal Medicine

## 2024-02-20 ENCOUNTER — PROCEDURE VISIT (OUTPATIENT)
Dept: MATERNAL FETAL MEDICINE | Facility: CLINIC | Age: 34
End: 2024-02-20
Payer: COMMERCIAL

## 2024-02-20 ENCOUNTER — OFFICE VISIT (OUTPATIENT)
Dept: MATERNAL FETAL MEDICINE | Facility: CLINIC | Age: 34
End: 2024-02-20
Payer: COMMERCIAL

## 2024-02-20 VITALS
HEIGHT: 67 IN | DIASTOLIC BLOOD PRESSURE: 68 MMHG | BODY MASS INDEX: 33.15 KG/M2 | HEART RATE: 97 BPM | SYSTOLIC BLOOD PRESSURE: 125 MMHG | WEIGHT: 211.19 LBS

## 2024-02-20 DIAGNOSIS — O99.119 FACTOR V LEIDEN MUTATION AFFECTING PREGNANCY: ICD-10-CM

## 2024-02-20 DIAGNOSIS — O99.119 FACTOR V LEIDEN MUTATION AFFECTING PREGNANCY: Primary | ICD-10-CM

## 2024-02-20 DIAGNOSIS — O99.342 MENTAL DISORDER AFFECTING PREGNANCY IN SECOND TRIMESTER: ICD-10-CM

## 2024-02-20 DIAGNOSIS — O09.512 PRIMIGRAVIDA OF ADVANCED MATERNAL AGE IN SECOND TRIMESTER: ICD-10-CM

## 2024-02-20 DIAGNOSIS — O99.119 THROMBOCYTOPENIA AFFECTING PREGNANCY: ICD-10-CM

## 2024-02-20 DIAGNOSIS — D68.51 FACTOR V LEIDEN MUTATION AFFECTING PREGNANCY: Primary | ICD-10-CM

## 2024-02-20 DIAGNOSIS — D69.6 THROMBOCYTOPENIA AFFECTING PREGNANCY: ICD-10-CM

## 2024-02-20 DIAGNOSIS — D68.51 FACTOR V LEIDEN MUTATION AFFECTING PREGNANCY: ICD-10-CM

## 2024-02-20 DIAGNOSIS — O34.42: ICD-10-CM

## 2024-02-20 PROCEDURE — 3008F BODY MASS INDEX DOCD: CPT | Mod: CPTII,S$GLB,, | Performed by: OBSTETRICS & GYNECOLOGY

## 2024-02-20 PROCEDURE — 3078F DIAST BP <80 MM HG: CPT | Mod: CPTII,S$GLB,, | Performed by: OBSTETRICS & GYNECOLOGY

## 2024-02-20 PROCEDURE — 76805 OB US >/= 14 WKS SNGL FETUS: CPT | Mod: S$GLB,,, | Performed by: OBSTETRICS & GYNECOLOGY

## 2024-02-20 PROCEDURE — 1159F MED LIST DOCD IN RCRD: CPT | Mod: CPTII,S$GLB,, | Performed by: OBSTETRICS & GYNECOLOGY

## 2024-02-20 PROCEDURE — 1160F RVW MEDS BY RX/DR IN RCRD: CPT | Mod: CPTII,S$GLB,, | Performed by: OBSTETRICS & GYNECOLOGY

## 2024-02-20 PROCEDURE — 99214 OFFICE O/P EST MOD 30 MIN: CPT | Mod: S$GLB,,, | Performed by: OBSTETRICS & GYNECOLOGY

## 2024-02-20 PROCEDURE — 3074F SYST BP LT 130 MM HG: CPT | Mod: CPTII,S$GLB,, | Performed by: OBSTETRICS & GYNECOLOGY

## 2024-02-20 NOTE — ASSESSMENT & PLAN NOTE
LEEP of the cervix has been associated with premature labor and  PROM. Most such deliveries are late  births (34-37 weeks), and many studies show no significant difference in the frequency of infants with birth weight <2500g.  Adverse outcomes may be associated with the  time interval from procedure to conception.    Cervical length measurement will be performed at the time of anatomy scan. Prophylactic cerclage placement solely for history of LEEP, has not been shown to provide benefit, and it is not recommended.    Recommendations:  Baseline transvaginal ultrasound cervical length (TVU CL) at anatomy scan (19-20 weeks' gestation- scheduled at [facility])  If TVU CL ?30 mm, then routine prenatal care.  If TVU CL 25-29 mm, then:  Repeat TVU CL in 7-14 days, if unchanged then routine prenatal care. If <25 mm, then see bullets directly below.  If TVU CL <25 mm, then:  Consideration for micronized progesterone 200mg nightly as a vaginal suppository until 37w0d.  Repeat TVU CL every 1-2 weeks until 22w6d for possible further shortening

## 2024-02-20 NOTE — ASSESSMENT & PLAN NOTE
Thrombocytopenia is a complex process leading to isolated thrombocytopenia (platelet count < 100 K/?L in absence of other etiologies. ITP can be primary (acquired immune-mediated disorder) or secondary (associated with underlying disease or drug exposure). The patient was counseled regarding the risks of ITP in pregnancy which include but are not limited to: mild to moderate bleeding (cutaneous or mucosal bleeding/both), risk of anesthetic complications (epidural hematoma), maternal hemorrhage (risk is higher in women with severe ITP-platelet count < 50 K/?L), fetal thrombocytopenia, and  thrombocytopenia (25% risk). Maternal platelet count does not predict the risk of  thrombocytopenia at birth. Severe fetal thrombocytopenia resulting in severe hemorrhage complications is rare (less than 1%). The  platelet count usually nadirs within the first 2 weeks of life. Assessing for antiplatelet antibodies is not indicated as the presence or absence of these is not useful in clinical management.    24 (@ [facility] office) - PLT 85K    Recommendations:  CBC and peripheral smear (monthly- will be obtained w/ hematologist)  Check CBC every 4 weeks; increase frequency of evaluation at 32 weeks to every 2 weeks, and weekly at 36 weeks and beyond  Continue care with Hematology/Oncology  If platelet count is > 50 K/?Land patient is asymptomatic, no treatment is indicated  Treatment is indicated:  For patients with a platelet count is < 30 K/?L   For patients who have symptomatic bleeding  For patients with a platelet count < 50 K/?L who are anticipated to undergo a  delivery  For patients with platelet count < 70 - 80 K/?L around the time of delivery to increase the likelihood of being able to safely receive neuraxial anesthesia   Anesthesia consultation is recommended around 30-32 weeks gestation  Platelet transfusion should be administered:  For patients with a platelet count < 30 K/?L and  undergoing vaginal delivery  For patients experiencing significant bleeding  For patients with a platelet count < 50 K/?L and undergoing  delivery  Treatment options:  First line: prednisone 0.5 - 2 mg/kg/day (response is usually seen within 4-14 days)/alternative is dexamethasone burst (40 mg POD daily x 4 days)  Stress dosed steroids are recommended at time of delivery in patients receiving prednisone ? 10 mg daily (or steroid equivalent) for ? 21 days   IVIG can be considered for refractory cases or when rapid increase is necessary in consultation with Hematology/Oncology  Splenectomy can be considered in extreme circumstances, if recommended by Hematology/Oncology (this is best accomplished in second trimester) - N/A  Ensure appropriate vaccinations (for pneumococcus, HIB, and meningococcus)  Avoid NSAIDs and aspirin (she has stopped ASA)  If candidate for anticoagulation (ie prophylactic lovenox), discuss with Hematology/Oncology - Dr Dumont aware of Ppx Lovenox for Factor V/hx VTE  Check CBC on admission to L&D; type and crossmatch for prbc and transfuse platelets, if appropriate, as above  Mode of delivery per obstetric indications; however, would avoid use of fetal scalp electrode and avoid operative vaginal delivery (particularly vacuum assisted)  Notify pediatrics of maternal condition after birth as  will need platelet count checked and circumcision of male infant may need to be deferred

## 2024-02-20 NOTE — ASSESSMENT & PLAN NOTE
Previously counseled the patient on the relationship between maternal age and fetal aneuploidy.  We discussed the risks and benefits of screening tests versus definitive genetic testing (amniocentesis). She was counseled about her specific age-related risk of fetal aneuploidy. We discussed the limitations of ultrasound in the definitive diagnosis of fetal aneuploidy.  I quoted the patient a 1 in 900 procedure related risk of fetal loss with genetic amniocentesis.  We also discussed cell free fetal DNA screening including the sensitivity and specificity of the test.  Her questions were answered and after today's consultation she did not want to pursue amniocentesis.  She did want to pursue NIPT - drawn today.   Additionally, we discussed the association between advanced maternal age (AMA) and preeclampsia, gestational diabetes, and fetal growth restriction.    Recommendations:  Detailed anatomic survey at 19-20 weeks    Follow-up fetal ultrasound at 32-34 weeks for interval fetal growth  Has discontinued d/t thrombocytopenia

## 2024-03-06 ENCOUNTER — OFFICE VISIT (OUTPATIENT)
Dept: HEMATOLOGY/ONCOLOGY | Facility: CLINIC | Age: 34
End: 2024-03-06
Payer: COMMERCIAL

## 2024-03-06 VITALS
WEIGHT: 215.5 LBS | HEIGHT: 67 IN | SYSTOLIC BLOOD PRESSURE: 111 MMHG | BODY MASS INDEX: 33.82 KG/M2 | HEART RATE: 80 BPM | DIASTOLIC BLOOD PRESSURE: 67 MMHG | OXYGEN SATURATION: 100 % | RESPIRATION RATE: 18 BRPM

## 2024-03-06 DIAGNOSIS — D68.51 FACTOR 5 LEIDEN MUTATION, HETEROZYGOUS: ICD-10-CM

## 2024-03-06 DIAGNOSIS — D69.6 THROMBOCYTOPENIA AFFECTING PREGNANCY: Primary | ICD-10-CM

## 2024-03-06 DIAGNOSIS — O99.119 THROMBOCYTOPENIA AFFECTING PREGNANCY: Primary | ICD-10-CM

## 2024-03-06 PROCEDURE — 3078F DIAST BP <80 MM HG: CPT | Mod: CPTII,S$GLB,, | Performed by: INTERNAL MEDICINE

## 2024-03-06 PROCEDURE — 99213 OFFICE O/P EST LOW 20 MIN: CPT | Mod: S$GLB,,, | Performed by: INTERNAL MEDICINE

## 2024-03-06 PROCEDURE — 99999 PR PBB SHADOW E&M-EST. PATIENT-LVL IV: CPT | Mod: PBBFAC,,, | Performed by: INTERNAL MEDICINE

## 2024-03-06 PROCEDURE — 1160F RVW MEDS BY RX/DR IN RCRD: CPT | Mod: CPTII,S$GLB,, | Performed by: INTERNAL MEDICINE

## 2024-03-06 PROCEDURE — 3008F BODY MASS INDEX DOCD: CPT | Mod: CPTII,S$GLB,, | Performed by: INTERNAL MEDICINE

## 2024-03-06 PROCEDURE — 1159F MED LIST DOCD IN RCRD: CPT | Mod: CPTII,S$GLB,, | Performed by: INTERNAL MEDICINE

## 2024-03-06 PROCEDURE — 3074F SYST BP LT 130 MM HG: CPT | Mod: CPTII,S$GLB,, | Performed by: INTERNAL MEDICINE

## 2024-03-06 NOTE — PROGRESS NOTES
Subjective:       Patient ID: Daryl Musa is a 33 y.o. female.    Chief Complaint: No chief complaint on file.      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  Factor V Leiden heterozygous mutation  History of bilateral pulmonary emboli at age 17 provoked by oral contraceptive pills.    Current Treatment:   Prophylactic Lovenox.     Treatment History:  Lovenox with bridge to warfarin, continued warfarin for 9 months in 2008.    HPI:  33-year-old female who was started on oral contraceptive pills in 2008 at the age of 17, she then developed bilateral pulmonary emboli.  At that time, she was checked for DVT, this was negative.  She underwent hypercoagulable workup that showed that she was heterozygous for factor 5 Leiden.  She was started on Lovenox and bridged to warfarin.  She then treated with warfarin for 9 months.  After that, she was done with therapy for a provoked blood clot.  In October of 2020, the patient had a CBC that was normal including a normal platelet count.  She was then diagnosed with COVID-19 on multiple occasions, including 08/17/2022.  On 11/17/2022, she had a CBC that revealed a platelet count of 78,000. Repeat on 11/29/2022 showed increase in her platelet count, however she still had thrombocytopenia with a platelet count of 108,000. The patient was on baby aspirin at that time, stopped taking baby aspirin and had a repeat CBC on 01/04/2023 that showed normalization of her platelet count.    Patient subsequently became pregnant, to her factor 5 Leiden heterozygous state, she was started on prophylactic Lovenox.  She  underwent blood work on 01/24/2024 that revealed a platelet count down to 84,000.  The rest of the CBC was within normal limits.  Due to her likely ITP along with a factor 5 Leiden heterozygous state, she was referred to Hematology.  I saw her on 02/07/2024.  At that visit, she stated that overall she was doing well and she had no major issues to discuss.  She denied any  bleeding or bruising.    Interval History:   Patient presents to clinic for scheduled follow up appointment.  Overall she was doing well.  She was having bruising at the site of her Lovenox injections, but she change the angle at which she injects and no longer has any bruising.  She has no other issues to discuss at this time.      Past Medical History:   Diagnosis Date    Anxiety     Dyspareunia     Endometriosis, unspecified     Factor 5 Leiden mutation, heterozygous     Fibroid     Infertility, female     Migraine     Obesity, unspecified     Other pulmonary embolism without acute cor pulmonale       Past Surgical History:   Procedure Laterality Date    bunionectomy left foot Left     CERVICAL BIOPSY  W/ LOOP ELECTRODE EXCISION      LAPAROSCOPY  2019    Dx:Emosis    LYSIS OF ADHESIONS  10/16/2019    MOUTH SURGERY      WISDOM TOOTH EXTRACTION       Social History     Socioeconomic History    Marital status:      Spouse name: melissa    Number of children: 0   Occupational History    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   Substance and Sexual Activity    Alcohol use: Not Currently     Comment: 1-2 times a month    Drug use: Never    Sexual activity: Yes     Partners: Male     Birth control/protection: None     Social Determinants of Health     Financial Resource Strain: Low Risk  (2023)    Overall Financial Resource Strain (CARDIA)     Difficulty of Paying Living Expenses: Not hard at all   Food Insecurity: No Food Insecurity (2023)    Hunger Vital Sign     Worried About Running Out of Food in the Last Year: Never true     Ran Out of Food in the Last Year: Never true   Transportation Needs: No Transportation  Needs (12/11/2023)    PRAPARE - Transportation     Lack of Transportation (Medical): No     Lack of Transportation (Non-Medical): No   Physical Activity: Insufficiently Active (12/11/2023)    Exercise Vital Sign     Days of Exercise per Week: 3 days     Minutes of Exercise per Session: 40 min   Stress: No Stress Concern Present (12/11/2023)    [place] [employer] - Occupational Stress Questionnaire     Feeling of Stress : Not at all   Social Connections: Unknown (12/11/2023)    Social Connection and Isolation Panel [NHANES]     Frequency of Communication with Friends and Family: More than three times a week     Frequency of Social Gatherings with Friends and Family: Twice a week     Active Member of Clubs or Organizations: No     Attends Club or Organization Meetings: Patient declined     Marital Status:    Housing Stability: Low Risk  (12/11/2023)    Housing Stability Vital Sign     Unable to Pay for Housing in the Last Year: No     Number of Places Lived in the Last Year: 1     Unstable Housing in the Last Year: No      Family History   Problem Relation Age of Onset    Cancer Mother     Breast cancer Mother     Migraines Mother     No Known Problems Sister       Review of patient's allergies indicates:   Allergen Reactions    Hydrocodone-acetaminophen Hives, Itching and Nausea And Vomiting    Adhesive Other (See Comments)     sensitivity to bandaids and surgical drapes    Hydrocodone Rash and Nausea And Vomiting      Review of Systems   Constitutional:  Negative for appetite change and unexpected weight change.   HENT:  Negative for mouth sores.    Eyes:  Negative for visual disturbance.   Respiratory:  Negative for cough and shortness of breath.    Cardiovascular:  Negative for chest pain.   Gastrointestinal:  Negative for abdominal pain and diarrhea.   Genitourinary:  Positive for frequency.   Musculoskeletal:  Positive for back pain.   Integumentary:  Negative for rash.    Neurological:  Negative for headaches.   Hematological:  Negative for adenopathy.   Psychiatric/Behavioral:  The patient is not nervous/anxious.          Objective:      Physical Exam  Vitals reviewed. Exam conducted with a chaperone present.   Constitutional:       General: She is not in acute distress.     Appearance: Normal appearance.   HENT:      Head: Normocephalic and atraumatic.      Nose: Nose normal.      Mouth/Throat:      Mouth: Mucous membranes are moist.   Eyes:      Extraocular Movements: Extraocular movements intact.      Conjunctiva/sclera: Conjunctivae normal.   Cardiovascular:      Rate and Rhythm: Normal rate and regular rhythm.      Pulses: Normal pulses.      Heart sounds: Normal heart sounds.   Pulmonary:      Effort: Pulmonary effort is normal.      Breath sounds: Normal breath sounds.   Abdominal:      General: Bowel sounds are normal.      Palpations: Abdomen is soft.   Musculoskeletal:         General: No swelling. Normal range of motion.      Cervical back: Normal range of motion and neck supple.      Right lower leg: No edema.      Left lower leg: No edema.   Lymphadenopathy:      Cervical: No cervical adenopathy.   Skin:     General: Skin is warm and dry.   Neurological:      General: No focal deficit present.      Mental Status: She is alert and oriented to person, place, and time. Mental status is at baseline.   Psychiatric:         Mood and Affect: Mood normal.         Behavior: Behavior normal.         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  Factor V Leiden heterozygous mutation  History of bilateral pulmonary emboli at age 17 provoked by oral contraceptive pills.        Plan:       At this time, the patient has a factor 5 Leiden heterozygous mutation and likely ITP.    She she was 16 weeks pregnant as of 03/06/2024.    I agree with prophylactic Lovenox at current dose.    Her CBC today, 03/06/2024 revealed a platelet count of  77,000.    Plan to see her every 4 weeks until she was about 36 weeks' gestation.  We will then plan to see her once every 2 weeks.  If she ever needed treatment to get her platelet count above 80,000 (currently at goal) so that she could receive an epidural, we would discuss treating with high-dose steroids and IVIG.    Return to clinic in 4 weeks with same day labs    Castro Dumont II, MD

## 2024-03-14 DIAGNOSIS — O99.342 MENTAL DISORDER AFFECTING PREGNANCY IN SECOND TRIMESTER: ICD-10-CM

## 2024-03-14 DIAGNOSIS — O99.119 FACTOR V LEIDEN MUTATION AFFECTING PREGNANCY: Primary | ICD-10-CM

## 2024-03-14 DIAGNOSIS — D68.51 FACTOR V LEIDEN MUTATION AFFECTING PREGNANCY: Primary | ICD-10-CM

## 2024-03-18 PROBLEM — Z00.00 WELLNESS EXAMINATION: Status: RESOLVED | Noted: 2022-11-09 | Resolved: 2024-03-18

## 2024-03-19 NOTE — PROGRESS NOTES
"Maternal Fetal Medicine follow up consult    SUBJECTIVE:     Daryl Musa is a 33 y.o.  female with IUP at 18w0d who is seen in follow up consultation by MFM.  Pregnancy complications include:   Problem   6. Pyelectasis of fetus on prenatal ultrasound   4. Thrombocytopenia affecting pregnancy   5. Pregnancy complicated by prior LEEP, antepartum, second trimester   2. AMA in second trimester   1. Factor V Leiden mutation affecting pregnancy   3. Anxiety/depression affecting pregnancy in second trimester     Daryl presents for routine follow up appointment.  She is without complaints. She adjusted the angle in which she injects Lovenox and no longer has bruising at injection sites.   She continues to follow with Dr Dumont for monthly labs. Most recent PLT 77K.   Denies LOF, VB, contractions. Positive fetal movement.    Previous notes reviewed.   No changes to medical, surgical, family, social, or obstetric history.    Interval history since last M visit: see above    Medications:  Current Outpatient Medications   Medication Instructions    cetirizine (ZYRTEC) 10 mg, Oral, Daily    enoxaparin (LOVENOX) 40 mg, Subcutaneous, Daily    prenatal vit no.124/iron/folic (PRENATAL VITAMIN ORAL) Oral, Daily    progesterone (PROMETRIUM) 200 mg, Oral, Every 14 days    pyridoxine (vitamin B6) (VITAMIN B-6) 25 mg, Oral, Daily    sertraline (ZOLOFT) 50 mg, Oral       Care team members:  Dr Marshall - Primary OB  Dr Dumont - Hematology    OBJECTIVE:   /64 (BP Location: Left arm)   Pulse 88   Ht 5' 7" (1.702 m)   Wt 97 kg (213 lb 11.8 oz)   LMP 2023 (Exact Date)   BMI 33.48 kg/m²     Ultrasound performed. See viewpoint for full ultrasound report.    A detailed fetal anatomic ultrasound examination was performed for the following high risk indication: AMA.   No fetal structural malformations are identified; however, fetal imaging is incomplete today as noted above.   A follow-up study will be scheduled to " complete the fetal anatomic survey.   Fetal size today is consistent with established gestational age.   Transvaginal cervical length is normal, measuring 3.6cm.  Placental location is anterior without evidence of previa.     ASSESSMENT/PLAN:     33 y.o.  female with IUP at 18w0d    1. Factor V Leiden mutation affecting pregnancy  Results from an inherited thrombophilia evaluation indicate that she is heterozygous for the Factor V Leiden mutation. Approximately 3-8% of the general population is heterozygous for the R506Q mutation in the Factor V gene. Factor V Leiden is characterized by poor anticoagulant response to activated protein C and an increased risk for venous thrombosis. Factor V Leiden is inactivated at a rate approximately 10 times slower than normal Factor V and persists longer in circulation, resulting in an increased thrombin generation and a mild hypercoagulable state.    We discussed the meaning of genes and mutations in depth. She is aware that one of her Factor V genes does not work the way that it should, which causes an increased risk for blood clots. We have discussed inheritance patterns with Factor V Leiden. Since she is heterozygous, there is a 50% chance that each of her children could inherit this change, and subsequently be at risk to develop thromboses when they are adults. Factor V Leiden testing is not routinely offerred to children because this is not a disorder that would change their medical management during childhood, as the risks of treatment are greater than the risk of thrombosis.     She is understandably concerned about her health, as well as the health of this pregnancy. We discussed that during pregnancy there is a 7-16 fold increase in thrombotic risk in Factor V Leiden heterozygotes. Factor V Leiden heterozygotes and homozygotes have also been found to be at an increased risk for pregnancy loss, particularly during the 2nd and 3rd trimesters. There is also a  controversial association between Factor V Leiden and pre-eclampsia, growth restriction, and placental abruption due to poor placental perfusion.  Although these risks are increased significantly over the general population, is should be noted that the risk for pregnancy related venous thrombosis in asymptomatic FVL heterozygotes is approximately 0.2%. In individuals homozygous for FVL, the risk for venous thrombosis is approximately 15%. It is important for patients who have Factor V Leiden to be tested for other inherited or acquired thrombophilias such as prothrombin, methylenetetrahydrofolate reductase (MTHFR), anti-cardiolipin, phospholipid dependent coagulation assays for lupus inhibitor, protein C activity, and protein S activity. If abnormal values or mutations were to be found in any of the above listed tests, the risk for thrombosis will increase.      Recommendations:  Prophylactic anticoagulation during the antepartum period/ prophylactic anticoagulation for 6 weeks postpartum  For patients with a history of PE, consider maternal echocardiogram to assess for right heart strain    We recommend use of LMWH/enoxaparin over unfractionated heparin If the patient is unable to fill this medication, or if there is a high likelihood for need to reverse this medication, unfractionated heparin should be prescribed. She will continue anticoagulation until 6 weeks postpartum.  Prophylaxis:  For patients with a BMI =< 40, prescribe lovenox 40 mg daily    Peripartum Management  -YARA hose/SCDs should be used while anticoagulation is interrupted.  -Regardless of whether the patient is on prophylactic dose UFH or LMWH, the last dose should be 12 hours prior to neuraxial anesthesia. For this reason, we no longer recommend conversion to UFH at 36 weeks.   -In patients with neuraxial anesthesia: prophylactic anticoagulation should not be initiated within 12 hours of neuraxial blockade or within 4 hours of epidural removal,  whichever is later.    Postpartum Management  -Prophylactic anticoagulation (defer to timing related to neuraxial anesthesia)  After vaginal delivery: should be started no sooner than 6 hours  After  delivery: should be started no sooner than 12 hours  -Breastfeeding is compatible with warfarin, UFH, and LMWH.      2. AMA in second trimester  Previously counseled the patient on the relationship between maternal age and fetal aneuploidy as well as the association between advanced maternal age (AMA) and preeclampsia, gestational diabetes, and fetal growth restriction.    NIPT low risk (46,XX)    Recommendations:  Detailed anatomic survey at 19-20 weeks    Follow-up fetal ultrasound at 32-34 weeks for interval fetal growth  Has discontinued d/t thrombocytopenia        3. Anxiety/depression affecting pregnancy in second trimester  She reports a history of anxiety and depression. She is taking Zoloft 50mg daily. She reports improvement of symptoms with the utilization of this medication regimen. Discussed increased risk for peripartum and postpartum mood disorders. Precautions provided.      We discussed the usage of SSRIs in pregnancy and the minimal risks associated with the fetus.      We have discussed the importance of optimization of mental health conditions during pregnancy in an effort to reduce the risk of postpartum mood disorders. We have discussed options for management including psychotherapy, counseling, cognitive behavioral therapy, exercise/yoga, journaling, and psychiatry services. She will notify our office if she is interested in being referred for any of the above.      4. Thrombocytopenia affecting pregnancy  Thrombocytopenia is a complex process leading to isolated thrombocytopenia (platelet count < 100 K/?L in absence of other etiologies. ITP can be primary (acquired immune-mediated disorder) or secondary (associated with underlying disease or drug exposure). The patient was counseled  regarding the risks of ITP in pregnancy which include but are not limited to: mild to moderate bleeding (cutaneous or mucosal bleeding/both), risk of anesthetic complications (epidural hematoma), maternal hemorrhage (risk is higher in women with severe ITP-platelet count < 50 K/?L), fetal thrombocytopenia, and  thrombocytopenia (25% risk). Maternal platelet count does not predict the risk of  thrombocytopenia at birth. Severe fetal thrombocytopenia resulting in severe hemorrhage complications is rare (less than 1%). The  platelet count usually nadirs within the first 2 weeks of life. Assessing for antiplatelet antibodies is not indicated as the presence or absence of these is not useful in clinical management.    24 (@ Julia's office) - PLT 85K  3/6/24 (through Dr Dumont) - PLT 77K    Recommendations:  Check CBC every 4 weeks; increase frequency of evaluation at 32 weeks to every 2 weeks, and weekly at 36 weeks and beyond (will obtain through Hematology - Dr Dumont)  Continue care with Hematology/Oncology  If platelet count is > 50 K/?Land patient is asymptomatic, no treatment is indicated  Treatment is indicated:  For patients with a platelet count is < 30 K/?L   For patients who have symptomatic bleeding  For patients with a platelet count < 50 K/?L who are anticipated to undergo a  delivery  For patients with platelet count < 70 - 80 K/?L around the time of delivery to increase the likelihood of being able to safely receive neuraxial anesthesia   Anesthesia consultation is recommended around 30-32 weeks gestation  Platelet transfusion should be administered:  For patients with a platelet count < 30 K/?L and undergoing vaginal delivery  For patients experiencing significant bleeding  For patients with a platelet count < 50 K/?L and undergoing  delivery  Treatment options:  First line: prednisone 0.5 - 2 mg/kg/day (response is usually seen within 4-14 days)/alternative  is dexamethasone burst (40 mg POD daily x 4 days)  Stress dosed steroids are recommended at time of delivery in patients receiving prednisone ? 10 mg daily (or steroid equivalent) for ? 21 days   IVIG can be considered for refractory cases or when rapid increase is necessary in consultation with Hematology/Oncology  Splenectomy can be considered in extreme circumstances, if recommended by Hematology/Oncology (this is best accomplished in second trimester) - N/A  Ensure appropriate vaccinations (for pneumococcus, HIB, and meningococcus)  Avoid NSAIDs and aspirin (she has stopped ASA)  If candidate for anticoagulation (ie prophylactic lovenox), discuss with Hematology/Oncology - Dr Dumont aware of Ppx Lovenox for Factor V/hx VTE  Check CBC on admission to L&D; type and crossmatch for prbc and transfuse platelets, if appropriate, as above  Mode of delivery per obstetric indications; however, would avoid use of fetal scalp electrode and avoid operative vaginal delivery (particularly vacuum assisted)  Notify pediatrics of maternal condition after birth as  will need platelet count checked and circumcision of male infant may need to be deferred      5. Pregnancy complicated by prior LEEP, antepartum, second trimester  LEEP of the cervix has been associated with premature labor and  PROM. Most such deliveries are late  births (34-37 weeks), and many studies show no significant difference in the frequency of infants with birth weight <2500g.  Adverse outcomes may be associated with the  time interval from procedure to conception.    Cervical length measurement will be performed at the time of anatomy scan. Prophylactic cerclage placement solely for history of LEEP, has not been shown to provide benefit, and it is not recommended.    3/20/24- TVU CL >30mm.     Recommendations:  Baseline transvaginal ultrasound cervical length (TVU CL) at anatomy scan (19-20 weeks' gestation- scheduled at [facility])  If TVU CL  ?30 mm, then routine prenatal care.          F/u in 4 weeks for MFM visit/growth ultrasound    Isabella Christensen MD  Maternal Fetal Medicine

## 2024-03-19 NOTE — ASSESSMENT & PLAN NOTE
LEEP of the cervix has been associated with premature labor and  PROM. Most such deliveries are late  births (34-37 weeks), and many studies show no significant difference in the frequency of infants with birth weight <2500g.  Adverse outcomes may be associated with the  time interval from procedure to conception.    Cervical length measurement will be performed at the time of anatomy scan. Prophylactic cerclage placement solely for history of LEEP, has not been shown to provide benefit, and it is not recommended.    3/20/24- TVU CL >30mm.     Recommendations:  Baseline transvaginal ultrasound cervical length (TVU CL) at anatomy scan (19-20 weeks' gestation- scheduled at [facility])  If TVU CL ?30 mm, then routine prenatal care.

## 2024-03-19 NOTE — ASSESSMENT & PLAN NOTE
Previously counseled the patient on the relationship between maternal age and fetal aneuploidy as well as the association between advanced maternal age (AMA) and preeclampsia, gestational diabetes, and fetal growth restriction.    NIPT low risk (46,XX)    Recommendations:  Detailed anatomic survey at 19-20 weeks    Follow-up fetal ultrasound at 32-34 weeks for interval fetal growth  Has discontinued d/t thrombocytopenia

## 2024-03-19 NOTE — ASSESSMENT & PLAN NOTE
Thrombocytopenia is a complex process leading to isolated thrombocytopenia (platelet count < 100 K/?L in absence of other etiologies. ITP can be primary (acquired immune-mediated disorder) or secondary (associated with underlying disease or drug exposure). The patient was counseled regarding the risks of ITP in pregnancy which include but are not limited to: mild to moderate bleeding (cutaneous or mucosal bleeding/both), risk of anesthetic complications (epidural hematoma), maternal hemorrhage (risk is higher in women with severe ITP-platelet count < 50 K/?L), fetal thrombocytopenia, and  thrombocytopenia (25% risk). Maternal platelet count does not predict the risk of  thrombocytopenia at birth. Severe fetal thrombocytopenia resulting in severe hemorrhage complications is rare (less than 1%). The  platelet count usually nadirs within the first 2 weeks of life. Assessing for antiplatelet antibodies is not indicated as the presence or absence of these is not useful in clinical management.    24 (@ Julia's office) - PLT 85K  3/6/24 (through Dr Dumont) - PLT 77K    Recommendations:  Check CBC every 4 weeks; increase frequency of evaluation at 32 weeks to every 2 weeks, and weekly at 36 weeks and beyond (will obtain through Hematology - Dr Dumont)  Continue care with Hematology/Oncology  If platelet count is > 50 K/?Land patient is asymptomatic, no treatment is indicated  Treatment is indicated:  For patients with a platelet count is < 30 K/?L   For patients who have symptomatic bleeding  For patients with a platelet count < 50 K/?L who are anticipated to undergo a  delivery  For patients with platelet count < 70 - 80 K/?L around the time of delivery to increase the likelihood of being able to safely receive neuraxial anesthesia   Anesthesia consultation is recommended around 30-32 weeks gestation  Platelet transfusion should be administered:  For patients with a platelet count < 30  K/?L and undergoing vaginal delivery  For patients experiencing significant bleeding  For patients with a platelet count < 50 K/?L and undergoing  delivery  Treatment options:  First line: prednisone 0.5 - 2 mg/kg/day (response is usually seen within 4-14 days)/alternative is dexamethasone burst (40 mg POD daily x 4 days)  Stress dosed steroids are recommended at time of delivery in patients receiving prednisone ? 10 mg daily (or steroid equivalent) for ? 21 days   IVIG can be considered for refractory cases or when rapid increase is necessary in consultation with Hematology/Oncology  Splenectomy can be considered in extreme circumstances, if recommended by Hematology/Oncology (this is best accomplished in second trimester) - N/A  Ensure appropriate vaccinations (for pneumococcus, HIB, and meningococcus)  Avoid NSAIDs and aspirin (she has stopped ASA)  If candidate for anticoagulation (ie prophylactic lovenox), discuss with Hematology/Oncology - Dr Dumont aware of Ppx Lovenox for Factor V/hx VTE  Check CBC on admission to L&D; type and crossmatch for prbc and transfuse platelets, if appropriate, as above  Mode of delivery per obstetric indications; however, would avoid use of fetal scalp electrode and avoid operative vaginal delivery (particularly vacuum assisted)  Notify pediatrics of maternal condition after birth as  will need platelet count checked and circumcision of male infant may need to be deferred

## 2024-03-20 ENCOUNTER — PROCEDURE VISIT (OUTPATIENT)
Dept: MATERNAL FETAL MEDICINE | Facility: CLINIC | Age: 34
End: 2024-03-20
Payer: COMMERCIAL

## 2024-03-20 ENCOUNTER — OFFICE VISIT (OUTPATIENT)
Dept: MATERNAL FETAL MEDICINE | Facility: CLINIC | Age: 34
End: 2024-03-20
Payer: COMMERCIAL

## 2024-03-20 VITALS
HEIGHT: 67 IN | HEART RATE: 88 BPM | WEIGHT: 213.75 LBS | SYSTOLIC BLOOD PRESSURE: 116 MMHG | DIASTOLIC BLOOD PRESSURE: 64 MMHG | BODY MASS INDEX: 33.55 KG/M2

## 2024-03-20 DIAGNOSIS — D68.51 FACTOR V LEIDEN MUTATION AFFECTING PREGNANCY: Primary | ICD-10-CM

## 2024-03-20 DIAGNOSIS — O99.342 MENTAL DISORDER AFFECTING PREGNANCY IN SECOND TRIMESTER: ICD-10-CM

## 2024-03-20 DIAGNOSIS — O34.42: ICD-10-CM

## 2024-03-20 DIAGNOSIS — D68.51 FACTOR V LEIDEN MUTATION AFFECTING PREGNANCY: ICD-10-CM

## 2024-03-20 DIAGNOSIS — D69.6 THROMBOCYTOPENIA AFFECTING PREGNANCY: ICD-10-CM

## 2024-03-20 DIAGNOSIS — O99.119 FACTOR V LEIDEN MUTATION AFFECTING PREGNANCY: Primary | ICD-10-CM

## 2024-03-20 DIAGNOSIS — O09.512 PRIMIGRAVIDA OF ADVANCED MATERNAL AGE IN SECOND TRIMESTER: ICD-10-CM

## 2024-03-20 DIAGNOSIS — O99.119 THROMBOCYTOPENIA AFFECTING PREGNANCY: ICD-10-CM

## 2024-03-20 DIAGNOSIS — O35.EXX0 PYELECTASIS OF FETUS ON PRENATAL ULTRASOUND: ICD-10-CM

## 2024-03-20 DIAGNOSIS — O99.119 FACTOR V LEIDEN MUTATION AFFECTING PREGNANCY: ICD-10-CM

## 2024-03-20 PROCEDURE — 3008F BODY MASS INDEX DOCD: CPT | Mod: CPTII,S$GLB,, | Performed by: OBSTETRICS & GYNECOLOGY

## 2024-03-20 PROCEDURE — 76817 TRANSVAGINAL US OBSTETRIC: CPT | Mod: S$GLB,,, | Performed by: OBSTETRICS & GYNECOLOGY

## 2024-03-20 PROCEDURE — 1160F RVW MEDS BY RX/DR IN RCRD: CPT | Mod: CPTII,S$GLB,, | Performed by: OBSTETRICS & GYNECOLOGY

## 2024-03-20 PROCEDURE — 3074F SYST BP LT 130 MM HG: CPT | Mod: CPTII,S$GLB,, | Performed by: OBSTETRICS & GYNECOLOGY

## 2024-03-20 PROCEDURE — 1159F MED LIST DOCD IN RCRD: CPT | Mod: CPTII,S$GLB,, | Performed by: OBSTETRICS & GYNECOLOGY

## 2024-03-20 PROCEDURE — 76811 OB US DETAILED SNGL FETUS: CPT | Mod: S$GLB,,, | Performed by: OBSTETRICS & GYNECOLOGY

## 2024-03-20 PROCEDURE — 3078F DIAST BP <80 MM HG: CPT | Mod: CPTII,S$GLB,, | Performed by: OBSTETRICS & GYNECOLOGY

## 2024-03-20 PROCEDURE — 99213 OFFICE O/P EST LOW 20 MIN: CPT | Mod: S$GLB,,, | Performed by: OBSTETRICS & GYNECOLOGY

## 2024-03-20 NOTE — ASSESSMENT & PLAN NOTE
Mild left sided renal pelvic dilation measuring 4.5mm was noted consistent with UTDA1. The kidneys appear normal as does the bladder. She has a low risk cfDNA and this is a female fetus.     The finding of mild renal pelvis dilation was discussed with the patient. We reviewed basic anatomy of the renal collecting system and then discussed possible etiologies for renal pelvis dilation. When dilation of the renal pelvis is borderline or mild, most cases will resolve spontaneously. However, if the renal dilation persists in the  period, the most common conditions that can cause renal pelvis dilation are UPJ obstruction (ureteropelvic junction), UVJ obstruction (ureterovesical junction) and VUR (vesicoureteral reflux). These conditions predispose infants/children to urinary tract infection, but can be effectively followed and treated. I explained that the vast majority of infants with these findings will have spontaneous resolution during pregnancy. We will plan to re-evaluate the kidneys at her next office visit.

## 2024-04-02 ENCOUNTER — LAB VISIT (OUTPATIENT)
Dept: LAB | Facility: HOSPITAL | Age: 34
End: 2024-04-02
Attending: INTERNAL MEDICINE
Payer: COMMERCIAL

## 2024-04-02 ENCOUNTER — OFFICE VISIT (OUTPATIENT)
Dept: HEMATOLOGY/ONCOLOGY | Facility: CLINIC | Age: 34
End: 2024-04-02
Payer: COMMERCIAL

## 2024-04-02 VITALS
TEMPERATURE: 98 F | HEART RATE: 87 BPM | WEIGHT: 213 LBS | BODY MASS INDEX: 33.43 KG/M2 | OXYGEN SATURATION: 96 % | RESPIRATION RATE: 18 BRPM | SYSTOLIC BLOOD PRESSURE: 118 MMHG | DIASTOLIC BLOOD PRESSURE: 74 MMHG | HEIGHT: 67 IN

## 2024-04-02 DIAGNOSIS — O99.119 THROMBOCYTOPENIA AFFECTING PREGNANCY: ICD-10-CM

## 2024-04-02 DIAGNOSIS — D69.6 THROMBOCYTOPENIA AFFECTING PREGNANCY: ICD-10-CM

## 2024-04-02 DIAGNOSIS — D68.51 FACTOR 5 LEIDEN MUTATION, HETEROZYGOUS: ICD-10-CM

## 2024-04-02 DIAGNOSIS — Z86.711 HISTORY OF PULMONARY EMBOLISM: ICD-10-CM

## 2024-04-02 DIAGNOSIS — D68.51 FACTOR 5 LEIDEN MUTATION, HETEROZYGOUS: Primary | ICD-10-CM

## 2024-04-02 LAB
ALBUMIN SERPL-MCNC: 3.2 G/DL (ref 3.5–5)
ALBUMIN/GLOB SERPL: 1.2 RATIO (ref 1.1–2)
ALP SERPL-CCNC: 55 UNIT/L (ref 40–150)
ALT SERPL-CCNC: 20 UNIT/L (ref 0–55)
AST SERPL-CCNC: 16 UNIT/L (ref 5–34)
BASOPHILS # BLD AUTO: 0.01 X10(3)/MCL
BASOPHILS NFR BLD AUTO: 0.1 %
BILIRUB SERPL-MCNC: 0.3 MG/DL
BUN SERPL-MCNC: 5.8 MG/DL (ref 7–18.7)
CALCIUM SERPL-MCNC: 9 MG/DL (ref 8.4–10.2)
CHLORIDE SERPL-SCNC: 109 MMOL/L (ref 98–107)
CO2 SERPL-SCNC: 19 MMOL/L (ref 22–29)
CREAT SERPL-MCNC: 0.71 MG/DL (ref 0.55–1.02)
EOSINOPHIL # BLD AUTO: 0.05 X10(3)/MCL (ref 0–0.9)
EOSINOPHIL NFR BLD AUTO: 0.5 %
ERYTHROCYTE [DISTWIDTH] IN BLOOD BY AUTOMATED COUNT: 13.6 % (ref 11.5–17)
GFR SERPLBLD CREATININE-BSD FMLA CKD-EPI: >60 MLS/MIN/1.73/M2
GLOBULIN SER-MCNC: 2.7 GM/DL (ref 2.4–3.5)
GLUCOSE SERPL-MCNC: 149 MG/DL (ref 74–100)
HCT VFR BLD AUTO: 32.2 % (ref 37–47)
HGB BLD-MCNC: 10.7 G/DL (ref 12–16)
IMM GRANULOCYTES # BLD AUTO: 0.03 X10(3)/MCL (ref 0–0.04)
IMM GRANULOCYTES NFR BLD AUTO: 0.3 %
LYMPHOCYTES # BLD AUTO: 1.29 X10(3)/MCL (ref 0.6–4.6)
LYMPHOCYTES NFR BLD AUTO: 12.9 %
MCH RBC QN AUTO: 29.3 PG (ref 27–31)
MCHC RBC AUTO-ENTMCNC: 33.2 G/DL (ref 33–36)
MCV RBC AUTO: 88.2 FL (ref 80–94)
MONOCYTES # BLD AUTO: 0.46 X10(3)/MCL (ref 0.1–1.3)
MONOCYTES NFR BLD AUTO: 4.6 %
NEUTROPHILS # BLD AUTO: 8.13 X10(3)/MCL (ref 2.1–9.2)
NEUTROPHILS NFR BLD AUTO: 81.6 %
PLATELET # BLD AUTO: 69 X10(3)/MCL (ref 130–400)
PMV BLD AUTO: 12 FL (ref 7.4–10.4)
POTASSIUM SERPL-SCNC: 3.9 MMOL/L (ref 3.5–5.1)
PROT SERPL-MCNC: 5.9 GM/DL (ref 6.4–8.3)
RBC # BLD AUTO: 3.65 X10(6)/MCL (ref 4.2–5.4)
SODIUM SERPL-SCNC: 137 MMOL/L (ref 136–145)
WBC # SPEC AUTO: 9.97 X10(3)/MCL (ref 4.5–11.5)

## 2024-04-02 PROCEDURE — 99999 PR PBB SHADOW E&M-EST. PATIENT-LVL IV: CPT | Mod: PBBFAC,,,

## 2024-04-02 PROCEDURE — 3074F SYST BP LT 130 MM HG: CPT | Mod: CPTII,S$GLB,,

## 2024-04-02 PROCEDURE — 3078F DIAST BP <80 MM HG: CPT | Mod: CPTII,S$GLB,,

## 2024-04-02 PROCEDURE — 85025 COMPLETE CBC W/AUTO DIFF WBC: CPT

## 2024-04-02 PROCEDURE — 99213 OFFICE O/P EST LOW 20 MIN: CPT | Mod: S$GLB,,,

## 2024-04-02 PROCEDURE — 1159F MED LIST DOCD IN RCRD: CPT | Mod: CPTII,S$GLB,,

## 2024-04-02 PROCEDURE — 1160F RVW MEDS BY RX/DR IN RCRD: CPT | Mod: CPTII,S$GLB,,

## 2024-04-02 PROCEDURE — 36415 COLL VENOUS BLD VENIPUNCTURE: CPT

## 2024-04-02 PROCEDURE — 80053 COMPREHEN METABOLIC PANEL: CPT

## 2024-04-02 PROCEDURE — 3008F BODY MASS INDEX DOCD: CPT | Mod: CPTII,S$GLB,,

## 2024-04-02 NOTE — PROGRESS NOTES
Subjective:       Patient ID: Daryl Musa is a 33 y.o. female.    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  Factor V Leiden heterozygous mutation  History of bilateral pulmonary emboli at age 17 provoked by oral contraceptive pills.    Current Treatment:   Prophylactic Lovenox.     Treatment History:  Lovenox with bridge to warfarin, continued warfarin for 9 months in 2008.    HPI:  33-year-old female who was started on oral contraceptive pills in 2008 at the age of 17, she then developed bilateral pulmonary emboli.  At that time, she was checked for DVT, this was negative.  She underwent hypercoagulable workup that showed that she was heterozygous for factor 5 Leiden.  She was started on Lovenox and bridged to warfarin.  She then treated with warfarin for 9 months.  After that, she was done with therapy for a provoked blood clot.  In October of 2020, the patient had a CBC that was normal including a normal platelet count.  She was then diagnosed with COVID-19 on multiple occasions, including 08/17/2022.  On 11/17/2022, she had a CBC that revealed a platelet count of 78,000. Repeat on 11/29/2022 showed increase in her platelet count, however she still had thrombocytopenia with a platelet count of 108,000. The patient was on baby aspirin at that time, stopped taking baby aspirin and had a repeat CBC on 01/04/2023 that showed normalization of her platelet count.    Patient subsequently became pregnant, to her factor 5 Leiden heterozygous state, she was started on prophylactic Lovenox.  She  underwent blood work on 01/24/2024 that revealed a platelet count down to 84,000.  The rest of the CBC was within normal limits.  Due to her likely ITP along with a factor 5 Leiden heterozygous state, she was referred to Hematology.  I saw her on 02/07/2024.  At that visit, she stated that overall she was doing well and she had no major issues to discuss.  She denied any bleeding or  bruising.    Interval History:   Patient presents to clinic for scheduled follow up appointment.  Overall she was doing well. She denies any abnormal bleeding, shortness of breath, chest pain, headache, or dizziness. She does report fatigue. She continues lovenox 40 mg daily.      Past Medical History:   Diagnosis Date    Anxiety     Dyspareunia     Endometriosis, unspecified     Factor 5 Leiden mutation, heterozygous     Fibroid     Infertility, female     Migraine     Obesity, unspecified     Other pulmonary embolism without acute cor pulmonale       Past Surgical History:   Procedure Laterality Date    bunionectomy left foot Left     CERVICAL BIOPSY  W/ LOOP ELECTRODE EXCISION      LAPAROSCOPY      Dx:Emosis    LYSIS OF ADHESIONS  10/16/2019    MOUTH SURGERY      WISDOM TOOTH EXTRACTION       Social History     Socioeconomic History    Marital status:      Spouse name: melissa    Number of children: 0   Occupational History    Occupation: [employer] Tech   Tobacco Use    Smoking status: Former     Current packs/day: 0.00     Average packs/day: 0.3 packs/day for 10.0 years (2.5 ttl pk-yrs)     Types: Cigarettes     Start date:      Quit date:      Years since quittin.2    Smokeless tobacco: Never    Tobacco comments:     Quit 1-1/2 years ago.   Substance and Sexual Activity    Alcohol use: Not Currently     Comment: 1-2 times a month    Drug use: Never    Sexual activity: Yes     Partners: Male     Birth control/protection: None     Social Determinants of Health     Financial Resource Strain: Low Risk  (2023)    Overall Financial Resource Strain (CARDIA)     Difficulty of Paying Living Expenses: Not hard at all   Food Insecurity: No Food Insecurity (2023)    Hunger Vital Sign     Worried About Running Out of Food in the Last Year: Never true     Ran Out of Food in the Last Year: Never true   Transportation Needs: No Transportation Needs (2023)    PRAPARE - Transportation     Lack  of Transportation (Medical): No     Lack of Transportation (Non-Medical): No   Physical Activity: Insufficiently Active (12/11/2023)    Exercise Vital Sign     Days of Exercise per Week: 3 days     Minutes of Exercise per Session: 40 min   Stress: No Stress Concern Present (12/11/2023)    [place] [employer] - Occupational Stress Questionnaire     Feeling of Stress : Not at all   Social Connections: Unknown (12/11/2023)    Social Connection and Isolation Panel [NHANES]     Frequency of Communication with Friends and Family: More than three times a week     Frequency of Social Gatherings with Friends and Family: Twice a week     Active Member of Clubs or Organizations: No     Attends Club or Organization Meetings: Patient declined     Marital Status:    Housing Stability: Low Risk  (12/11/2023)    Housing Stability Vital Sign     Unable to Pay for Housing in the Last Year: No     Number of Places Lived in the Last Year: 1     Unstable Housing in the Last Year: No      Family History   Problem Relation Age of Onset    Cancer Mother     Breast cancer Mother     Migraines Mother     No Known Problems Sister       Review of patient's allergies indicates:   Allergen Reactions    Hydrocodone-acetaminophen Hives, Itching and Nausea And Vomiting    Adhesive Other (See Comments)     sensitivity to bandaids and surgical drapes    Hydrocodone Rash and Nausea And Vomiting      Review of Systems   Constitutional:  Negative for appetite change and unexpected weight change.   HENT:  Negative for mouth sores.    Eyes:  Negative for visual disturbance.   Respiratory:  Negative for cough and shortness of breath.    Cardiovascular:  Negative for chest pain.   Gastrointestinal:  Negative for abdominal pain and diarrhea.   Genitourinary:  Positive for frequency.   Musculoskeletal:  Positive for back pain.   Integumentary:  Negative for rash.   Neurological:  Negative for headaches.   Hematological:  Negative  for adenopathy.   Psychiatric/Behavioral:  The patient is not nervous/anxious.          Objective:      Physical Exam  Vitals reviewed. Exam conducted with a chaperone present.   Constitutional:       General: She is not in acute distress.     Appearance: Normal appearance.   HENT:      Head: Normocephalic and atraumatic.      Nose: Nose normal.      Mouth/Throat:      Mouth: Mucous membranes are moist.   Eyes:      Extraocular Movements: Extraocular movements intact.      Conjunctiva/sclera: Conjunctivae normal.   Cardiovascular:      Rate and Rhythm: Normal rate and regular rhythm.      Pulses: Normal pulses.      Heart sounds: Normal heart sounds.   Pulmonary:      Effort: Pulmonary effort is normal.      Breath sounds: Normal breath sounds.   Abdominal:      General: Bowel sounds are normal.      Palpations: Abdomen is soft.   Musculoskeletal:         General: No swelling. Normal range of motion.      Cervical back: Normal range of motion and neck supple.      Right lower leg: No edema.      Left lower leg: No edema.   Lymphadenopathy:      Cervical: No cervical adenopathy.   Skin:     General: Skin is warm and dry.   Neurological:      General: No focal deficit present.      Mental Status: She is alert and oriented to person, place, and time. Mental status is at baseline.   Psychiatric:         Mood and Affect: Mood normal.         Behavior: Behavior normal.         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  Factor V Leiden heterozygous mutation  History of bilateral pulmonary emboli at age 17 provoked by oral contraceptive pills.        Plan:       At this time, the patient has a factor 5 Leiden heterozygous mutation and likely ITP.    She is 20 weeks pregnant as of 04/02/2024.    I agree with prophylactic Lovenox at current dose.    Her CBC today, 04/01/2024 revealed a platelet count of 69,000.    Plan to see her every 4 weeks until she was about 36 weeks'  gestation.  We will then plan to see her once every 2 weeks.  If she ever needed treatment to get her platelet count above 80,000 (currently at goal) so that she could receive an epidural, we would discuss treating with high-dose steroids and IVIG.    Return to clinic in 4 weeks with same day labs    SANGEETHA Wilson  Oncology/Hematology  [facility]

## 2024-04-11 DIAGNOSIS — O99.119 FACTOR V LEIDEN MUTATION AFFECTING PREGNANCY: Primary | ICD-10-CM

## 2024-04-11 DIAGNOSIS — D68.51 FACTOR V LEIDEN MUTATION AFFECTING PREGNANCY: Primary | ICD-10-CM

## 2024-04-18 ENCOUNTER — PROCEDURE VISIT (OUTPATIENT)
Dept: MATERNAL FETAL MEDICINE | Facility: CLINIC | Age: 34
End: 2024-04-18
Payer: COMMERCIAL

## 2024-04-18 ENCOUNTER — OFFICE VISIT (OUTPATIENT)
Dept: MATERNAL FETAL MEDICINE | Facility: CLINIC | Age: 34
End: 2024-04-18
Payer: COMMERCIAL

## 2024-04-18 VITALS
DIASTOLIC BLOOD PRESSURE: 66 MMHG | SYSTOLIC BLOOD PRESSURE: 123 MMHG | HEART RATE: 88 BPM | BODY MASS INDEX: 33.55 KG/M2 | HEIGHT: 67 IN | WEIGHT: 213.75 LBS

## 2024-04-18 DIAGNOSIS — O35.EXX0 PYELECTASIS OF FETUS ON PRENATAL ULTRASOUND: ICD-10-CM

## 2024-04-18 DIAGNOSIS — D68.51 FACTOR V LEIDEN MUTATION AFFECTING PREGNANCY: ICD-10-CM

## 2024-04-18 DIAGNOSIS — O99.119 THROMBOCYTOPENIA AFFECTING PREGNANCY: ICD-10-CM

## 2024-04-18 DIAGNOSIS — D69.6 THROMBOCYTOPENIA AFFECTING PREGNANCY: ICD-10-CM

## 2024-04-18 DIAGNOSIS — O09.512 PRIMIGRAVIDA OF ADVANCED MATERNAL AGE IN SECOND TRIMESTER: ICD-10-CM

## 2024-04-18 DIAGNOSIS — O34.42: ICD-10-CM

## 2024-04-18 DIAGNOSIS — O99.119 FACTOR V LEIDEN MUTATION AFFECTING PREGNANCY: ICD-10-CM

## 2024-04-18 DIAGNOSIS — O99.342 MENTAL DISORDER AFFECTING PREGNANCY IN SECOND TRIMESTER: Primary | ICD-10-CM

## 2024-04-18 PROCEDURE — 99214 OFFICE O/P EST MOD 30 MIN: CPT | Mod: S$GLB,,, | Performed by: OBSTETRICS & GYNECOLOGY

## 2024-04-18 PROCEDURE — 3074F SYST BP LT 130 MM HG: CPT | Mod: CPTII,S$GLB,, | Performed by: OBSTETRICS & GYNECOLOGY

## 2024-04-18 PROCEDURE — 1159F MED LIST DOCD IN RCRD: CPT | Mod: CPTII,S$GLB,, | Performed by: OBSTETRICS & GYNECOLOGY

## 2024-04-18 PROCEDURE — 76816 OB US FOLLOW-UP PER FETUS: CPT | Mod: S$GLB,,, | Performed by: OBSTETRICS & GYNECOLOGY

## 2024-04-18 PROCEDURE — 3008F BODY MASS INDEX DOCD: CPT | Mod: CPTII,S$GLB,, | Performed by: OBSTETRICS & GYNECOLOGY

## 2024-04-18 PROCEDURE — 1160F RVW MEDS BY RX/DR IN RCRD: CPT | Mod: CPTII,S$GLB,, | Performed by: OBSTETRICS & GYNECOLOGY

## 2024-04-18 PROCEDURE — 3078F DIAST BP <80 MM HG: CPT | Mod: CPTII,S$GLB,, | Performed by: OBSTETRICS & GYNECOLOGY

## 2024-04-18 NOTE — ASSESSMENT & PLAN NOTE
LEEP of the cervix has been associated with premature labor and  PROM. Most such deliveries are late  births (34-37 weeks), and many studies show no significant difference in the frequency of infants with birth weight <2500g.  Adverse outcomes may be associated with the  time interval from procedure to conception.    Cervical length measurement will be performed at the time of anatomy scan. Prophylactic cerclage placement solely for history of LEEP, has not been shown to provide benefit, and it is not recommended.    3/20/24- TVU CL >30mm.     Recommendations:  Baseline transvaginal ultrasound cervical length (TVU CL) at anatomy scan (19-20 weeks' gestation- scheduled at [facility])  If TVU CL ?30 mm, then routine prenatal care.

## 2024-04-18 NOTE — PROGRESS NOTES
"Maternal Fetal Medicine follow up consult    SUBJECTIVE:     Daryl Musa is a 33 y.o.  female with IUP at 22w2d who is seen in follow up consultation by MFM.  Pregnancy complications include:   Problem   - Pyelectasis of fetus on prenatal ultrasound   - Thrombocytopenia affecting pregnancy   - Pregnancy complicated by prior LEEP, antepartum, second trimester   - AMA in second trimester   - Factor V Leiden mutation affecting pregnancy   - Anxiety/depression affecting pregnancy in second trimester     Daryl presents for routine follow up appointment.  Recently evaluated by hematology with plans to continue close follow up monthly. Most recent PLT 69K. Continues to take Lovenox 40mg daily.  Denies LOF, VB, contractions. Positive fetal movement.  She feels well and has no current issues. Some LE edema.     Previous notes reviewed.   No changes to medical, surgical, family, social, or obstetric history.    Interval history since last MFM visit: see above    Medications reviewed.    Care team members:  Dr Marshall - Primary OB     OBJECTIVE:   /66 (BP Location: Right arm)   Pulse 88   Ht 5' 7" (1.702 m)   Wt 97 kg (213 lb 11.8 oz)   LMP 2023 (Exact Date)   BMI 33.48 kg/m²     Ultrasound performed. See viewpoint for full ultrasound report.    A viable dia pregnancy is visualized in cephalic presentation.  Estimated fetal weight is at the 59th percentile with an abdominal circumference at the 89th percentile. Bilateral renal pelvis dilaton is noted measuring 6.4mm (left) and 5.5mm (right) consistent with UTDA1 (mild). Anatomy is complete.  Amniotic fluid volume is normal.  Placenta is anterior.      ASSESSMENT/PLAN:     33 y.o.  female with IUP at 22w2d    - Anxiety/depression affecting pregnancy in second trimester  She reports a history of anxiety and depression. She is taking Zoloft 50mg daily. She reports improvement of symptoms with the utilization of this medication regimen. " Discussed increased risk for peripartum and postpartum mood disorders. Precautions provided.      We discussed the usage of SSRIs in pregnancy and the minimal risks associated with the fetus.      We have discussed the importance of optimization of mental health conditions during pregnancy in an effort to reduce the risk of postpartum mood disorders. We have discussed options for management including psychotherapy, counseling, cognitive behavioral therapy, exercise/yoga, journaling, and psychiatry services. She will notify our office if she is interested in being referred for any of the above.      - Factor V Leiden mutation affecting pregnancy  Results from an inherited thrombophilia evaluation indicate that she is heterozygous for the Factor V Leiden mutation. Approximately 3-8% of the general population is heterozygous for the [id number] mutation in the Factor V gene. Factor V Leiden is characterized by poor anticoagulant response to activated protein C and an increased risk for venous thrombosis. Factor V Leiden is inactivated at a rate approximately 10 times slower than normal Factor V and persists longer in circulation, resulting in an increased thrombin generation and a mild hypercoagulable state.    We discussed the meaning of genes and mutations in depth. She is aware that one of her Factor V genes does not work the way that it should, which causes an increased risk for blood clots. We have discussed inheritance patterns with Factor V Leiden. Since she is heterozygous, there is a 50% chance that each of her children could inherit this change, and subsequently be at risk to develop thromboses when they are adults. Factor V Leiden testing is not routinely offerred to children because this is not a disorder that would change their medical management during childhood, as the risks of treatment are greater than the risk of thrombosis.     She is understandably concerned about her health, as well as the health of this  pregnancy. We discussed that during pregnancy there is a 7-16 fold increase in thrombotic risk in Factor V Leiden heterozygotes. Factor V Leiden heterozygotes and homozygotes have also been found to be at an increased risk for pregnancy loss, particularly during the 2nd and 3rd trimesters. There is also a controversial association between Factor V Leiden and pre-eclampsia, growth restriction, and placental abruption due to poor placental perfusion.  Although these risks are increased significantly over the general population, is should be noted that the risk for pregnancy related venous thrombosis in asymptomatic FVL heterozygotes is approximately 0.2%. In individuals homozygous for FVL, the risk for venous thrombosis is approximately 15%. It is important for patients who have Factor V Leiden to be tested for other inherited or acquired thrombophilias such as prothrombin, methylenetetrahydrofolate reductase (MTHFR), anti-cardiolipin, phospholipid dependent coagulation assays for lupus inhibitor, protein C activity, and protein S activity. If abnormal values or mutations were to be found in any of the above listed tests, the risk for thrombosis will increase.      Recommendations:  Prophylactic anticoagulation during the antepartum period/ prophylactic anticoagulation for 6 weeks postpartum  For patients with a history of PE, consider maternal echocardiogram to assess for right heart strain    We recommend use of LMWH/enoxaparin over unfractionated heparin If the patient is unable to fill this medication, or if there is a high likelihood for need to reverse this medication, unfractionated heparin should be prescribed. She will continue anticoagulation until 6 weeks postpartum.  Prophylaxis:  For patients with a BMI =< 40, prescribe lovenox 40 mg daily    Peripartum Management  -YARA hose/SCDs should be used while anticoagulation is interrupted.  -Regardless of whether the patient is on prophylactic dose UFH or LMWH,  the last dose should be 12 hours prior to neuraxial anesthesia. For this reason, we no longer recommend conversion to UFH at 36 weeks.   -In patients with neuraxial anesthesia: prophylactic anticoagulation should not be initiated within 12 hours of neuraxial blockade or within 4 hours of epidural removal, whichever is later.    Postpartum Management  -Prophylactic anticoagulation (defer to timing related to neuraxial anesthesia)  After vaginal delivery: should be started no sooner than 6 hours  After  delivery: should be started no sooner than 12 hours  -Breastfeeding is compatible with warfarin, UFH, and LMWH.      - AMA in second trimester  Previously counseled the patient on the relationship between maternal age and fetal aneuploidy as well as the association between advanced maternal age (AMA) and preeclampsia, gestational diabetes, and fetal growth restriction.    NIPT low risk (46,XX)    Recommendations:  Detailed anatomic survey at 19-20 weeks    Follow-up fetal ultrasound at 32-34 weeks for interval fetal growth  Has discontinued d/t thrombocytopenia        - Thrombocytopenia affecting pregnancy  Thrombocytopenia is a complex process leading to isolated thrombocytopenia (platelet count < 100 K/?L in absence of other etiologies. ITP can be primary (acquired immune-mediated disorder) or secondary (associated with underlying disease or drug exposure). The patient was counseled regarding the risks of ITP in pregnancy which include but are not limited to: mild to moderate bleeding (cutaneous or mucosal bleeding/both), risk of anesthetic complications (epidural hematoma), maternal hemorrhage (risk is higher in women with severe ITP-platelet count < 50 K/?L), fetal thrombocytopenia, and  thrombocytopenia (25% risk). Maternal platelet count does not predict the risk of  thrombocytopenia at birth. Severe fetal thrombocytopenia resulting in severe hemorrhage complications is rare (less than  1%). The  platelet count usually nadirs within the first 2 weeks of life. Assessing for antiplatelet antibodies is not indicated as the presence or absence of these is not useful in clinical management.    24 (@ [facility]'s office) - PLT 85K  3/6/24 (through Dr Dumont) - PLT 77K  24 ([place])- PLT 69K    Recommendations:  Check CBC every 4 weeks; increase frequency of evaluation at 32 weeks to every 2 weeks, and weekly at 36 weeks and beyond (will obtain through Hematology - Dr Dumont)  Continue care with Hematology/Oncology  If platelet count is > 50 K/?Land patient is asymptomatic, no treatment is indicated  Treatment is indicated:  For patients with a platelet count is < 30 K/?L   For patients who have symptomatic bleeding  For patients with a platelet count < 50 K/?L who are anticipated to undergo a  delivery  For patients with platelet count < 70 - 80 K/?L around the time of delivery to increase the likelihood of being able to safely receive neuraxial anesthesia   Anesthesia consultation is recommended around 30-32 weeks gestation  Platelet transfusion should be administered:  For patients with a platelet count < 30 K/?L and undergoing vaginal delivery  For patients experiencing significant bleeding  For patients with a platelet count < 50 K/?L and undergoing  delivery  Treatment options:  First line: prednisone 0.5 - 2 mg/kg/day (response is usually seen within 4-14 days)/alternative is dexamethasone burst (40 mg POD daily x 4 days)  Stress dosed steroids are recommended at time of delivery in patients receiving prednisone ? 10 mg daily (or steroid equivalent) for ? 21 days   IVIG can be considered for refractory cases or when rapid increase is necessary in consultation with Hematology/Oncology  Splenectomy can be considered in extreme circumstances, if recommended by Hematology/Oncology (this is best accomplished in second trimester) - N/A  Ensure appropriate vaccinations (for  pneumococcus, HIB, and meningococcus)  Avoid NSAIDs and aspirin (she has stopped ASA)  If candidate for anticoagulation (ie prophylactic lovenox), discuss with Hematology/Oncology - Dr Dumont aware of Ppx Lovenox for Factor V/hx VTE  Check CBC on admission to L&D; type and crossmatch for prbc and transfuse platelets, if appropriate, as above  Mode of delivery per obstetric indications; however, would avoid use of fetal scalp electrode and avoid operative vaginal delivery (particularly vacuum assisted)  Notify pediatrics of maternal condition after birth as  will need platelet count checked and circumcision of male infant may need to be deferred      - Pregnancy complicated by prior LEEP, antepartum, second trimester  LEEP of the cervix has been associated with premature labor and  PROM. Most such deliveries are late  births (34-37 weeks), and many studies show no significant difference in the frequency of infants with birth weight <2500g.  Adverse outcomes may be associated with the  time interval from procedure to conception.    Cervical length measurement will be performed at the time of anatomy scan. Prophylactic cerclage placement solely for history of LEEP, has not been shown to provide benefit, and it is not recommended.    3/20/24- TVU CL >30mm.     Recommendations:  Baseline transvaginal ultrasound cervical length (TVU CL) at anatomy scan (19-20 weeks' gestation- scheduled at [facility])  If TVU CL ?30 mm, then routine prenatal care.      - Pyelectasis of fetus on prenatal ultrasound  Mild bilateral renal pelvic dilation measuring 6.4mm on the left and 5.5mm on the right was noted consistent with UTDA1. The kidneys appear normal as does the bladder. She has a low risk cfDNA and this is a female fetus      The finding of mild renal pelvis dilation was discussed with the patient. We reviewed basic anatomy of the renal collecting system and then discussed possible etiologies for renal pelvis  dilation. When dilation of the renal pelvis is borderline or mild, most cases will resolve spontaneously. However, if the renal dilation persists in the  period, the most common conditions that can cause renal pelvis dilation are UPJ obstruction (ureteropelvic junction), UVJ obstruction (ureterovesical junction) and VUR (vesicoureteral reflux). These conditions predispose infants/children to urinary tract infection, but can be effectively followed and treated. I explained that the vast majority of infants with these findings will have spontaneous resolution during pregnancy. We will plan to re-evaluate the kidneys at her next office visit.       F/u in 4 weeks for MFM visit /growth ultrasound    Isabella Christensen MD  Maternal Fetal Medicine

## 2024-04-18 NOTE — ASSESSMENT & PLAN NOTE
Thrombocytopenia is a complex process leading to isolated thrombocytopenia (platelet count < 100 K/?L in absence of other etiologies. ITP can be primary (acquired immune-mediated disorder) or secondary (associated with underlying disease or drug exposure). The patient was counseled regarding the risks of ITP in pregnancy which include but are not limited to: mild to moderate bleeding (cutaneous or mucosal bleeding/both), risk of anesthetic complications (epidural hematoma), maternal hemorrhage (risk is higher in women with severe ITP-platelet count < 50 K/?L), fetal thrombocytopenia, and  thrombocytopenia (25% risk). Maternal platelet count does not predict the risk of  thrombocytopenia at birth. Severe fetal thrombocytopenia resulting in severe hemorrhage complications is rare (less than 1%). The  platelet count usually nadirs within the first 2 weeks of life. Assessing for antiplatelet antibodies is not indicated as the presence or absence of these is not useful in clinical management.    24 (@ [facility]'s office) - PLT 85K  3/6/24 (through Dr Dumont) - PLT 77K  24 ([place])- PLT 69K    Recommendations:  Check CBC every 4 weeks; increase frequency of evaluation at 32 weeks to every 2 weeks, and weekly at 36 weeks and beyond (will obtain through Hematology - Dr Dumont)  Continue care with Hematology/Oncology  If platelet count is > 50 K/?Land patient is asymptomatic, no treatment is indicated  Treatment is indicated:  For patients with a platelet count is < 30 K/?L   For patients who have symptomatic bleeding  For patients with a platelet count < 50 K/?L who are anticipated to undergo a  delivery  For patients with platelet count < 70 - 80 K/?L around the time of delivery to increase the likelihood of being able to safely receive neuraxial anesthesia   Anesthesia consultation is recommended around 30-32 weeks gestation  Platelet transfusion should be administered:  For patients  with a platelet count < 30 K/?L and undergoing vaginal delivery  For patients experiencing significant bleeding  For patients with a platelet count < 50 K/?L and undergoing  delivery  Treatment options:  First line: prednisone 0.5 - 2 mg/kg/day (response is usually seen within 4-14 days)/alternative is dexamethasone burst (40 mg POD daily x 4 days)  Stress dosed steroids are recommended at time of delivery in patients receiving prednisone ? 10 mg daily (or steroid equivalent) for ? 21 days   IVIG can be considered for refractory cases or when rapid increase is necessary in consultation with Hematology/Oncology  Splenectomy can be considered in extreme circumstances, if recommended by Hematology/Oncology (this is best accomplished in second trimester) - N/A  Ensure appropriate vaccinations (for pneumococcus, HIB, and meningococcus)  Avoid NSAIDs and aspirin (she has stopped ASA)  If candidate for anticoagulation (ie prophylactic lovenox), discuss with Hematology/Oncology - Dr Dumont aware of Ppx Lovenox for Factor V/hx VTE  Check CBC on admission to L&D; type and crossmatch for prbc and transfuse platelets, if appropriate, as above  Mode of delivery per obstetric indications; however, would avoid use of fetal scalp electrode and avoid operative vaginal delivery (particularly vacuum assisted)  Notify pediatrics of maternal condition after birth as  will need platelet count checked and circumcision of male infant may need to be deferred

## 2024-04-18 NOTE — ASSESSMENT & PLAN NOTE
Mild bilateral renal pelvic dilation measuring 6.4mm on the left and 5.5mm on the right was noted consistent with UTDA1. The kidneys appear normal as does the bladder. She has a low risk cfDNA and this is a female fetus      The finding of mild renal pelvis dilation was discussed with the patient. We reviewed basic anatomy of the renal collecting system and then discussed possible etiologies for renal pelvis dilation. When dilation of the renal pelvis is borderline or mild, most cases will resolve spontaneously. However, if the renal dilation persists in the  period, the most common conditions that can cause renal pelvis dilation are UPJ obstruction (ureteropelvic junction), UVJ obstruction (ureterovesical junction) and VUR (vesicoureteral reflux). These conditions predispose infants/children to urinary tract infection, but can be effectively followed and treated. I explained that the vast majority of infants with these findings will have spontaneous resolution during pregnancy. We will plan to re-evaluate the kidneys at her next office visit.

## 2024-05-02 ENCOUNTER — LAB VISIT (OUTPATIENT)
Dept: LAB | Facility: HOSPITAL | Age: 34
End: 2024-05-02
Attending: INTERNAL MEDICINE
Payer: COMMERCIAL

## 2024-05-02 ENCOUNTER — OFFICE VISIT (OUTPATIENT)
Dept: HEMATOLOGY/ONCOLOGY | Facility: CLINIC | Age: 34
End: 2024-05-02
Payer: COMMERCIAL

## 2024-05-02 VITALS
BODY MASS INDEX: 33.53 KG/M2 | RESPIRATION RATE: 16 BRPM | DIASTOLIC BLOOD PRESSURE: 68 MMHG | SYSTOLIC BLOOD PRESSURE: 118 MMHG | HEART RATE: 82 BPM | OXYGEN SATURATION: 96 % | TEMPERATURE: 99 F | WEIGHT: 213.63 LBS | HEIGHT: 67 IN

## 2024-05-02 DIAGNOSIS — D69.6 THROMBOCYTOPENIA AFFECTING PREGNANCY: ICD-10-CM

## 2024-05-02 DIAGNOSIS — D68.51 FACTOR V LEIDEN MUTATION AFFECTING PREGNANCY: Primary | ICD-10-CM

## 2024-05-02 DIAGNOSIS — Z86.711 HISTORY OF PULMONARY EMBOLISM: ICD-10-CM

## 2024-05-02 DIAGNOSIS — O99.119 THROMBOCYTOPENIA AFFECTING PREGNANCY: ICD-10-CM

## 2024-05-02 DIAGNOSIS — D68.51 FACTOR 5 LEIDEN MUTATION, HETEROZYGOUS: ICD-10-CM

## 2024-05-02 DIAGNOSIS — O99.119 FACTOR V LEIDEN MUTATION AFFECTING PREGNANCY: Primary | ICD-10-CM

## 2024-05-02 LAB
ALBUMIN SERPL-MCNC: 3.3 G/DL (ref 3.5–5)
ALBUMIN/GLOB SERPL: 1.1 RATIO (ref 1.1–2)
ALP SERPL-CCNC: 55 UNIT/L (ref 40–150)
ALT SERPL-CCNC: 14 UNIT/L (ref 0–55)
AST SERPL-CCNC: 16 UNIT/L (ref 5–34)
BASOPHILS # BLD AUTO: 0.01 X10(3)/MCL
BASOPHILS NFR BLD AUTO: 0.1 %
BILIRUB SERPL-MCNC: 0.3 MG/DL
BUN SERPL-MCNC: 8.9 MG/DL (ref 7–18.7)
CALCIUM SERPL-MCNC: 8.8 MG/DL (ref 8.4–10.2)
CHLORIDE SERPL-SCNC: 107 MMOL/L (ref 98–107)
CO2 SERPL-SCNC: 18 MMOL/L (ref 22–29)
CREAT SERPL-MCNC: 0.74 MG/DL (ref 0.55–1.02)
EOSINOPHIL # BLD AUTO: 0.08 X10(3)/MCL (ref 0–0.9)
EOSINOPHIL NFR BLD AUTO: 0.7 %
ERYTHROCYTE [DISTWIDTH] IN BLOOD BY AUTOMATED COUNT: 14 % (ref 11.5–17)
GFR SERPLBLD CREATININE-BSD FMLA CKD-EPI: >60 MLS/MIN/1.73/M2
GLOBULIN SER-MCNC: 3 GM/DL (ref 2.4–3.5)
GLUCOSE SERPL-MCNC: 100 MG/DL (ref 74–100)
HCT VFR BLD AUTO: 32.9 % (ref 37–47)
HGB BLD-MCNC: 11 G/DL (ref 12–16)
IMM GRANULOCYTES # BLD AUTO: 0.06 X10(3)/MCL (ref 0–0.04)
IMM GRANULOCYTES NFR BLD AUTO: 0.5 %
LYMPHOCYTES # BLD AUTO: 1.37 X10(3)/MCL (ref 0.6–4.6)
LYMPHOCYTES NFR BLD AUTO: 11.7 %
MCH RBC QN AUTO: 29.1 PG (ref 27–31)
MCHC RBC AUTO-ENTMCNC: 33.4 G/DL (ref 33–36)
MCV RBC AUTO: 87 FL (ref 80–94)
MONOCYTES # BLD AUTO: 0.62 X10(3)/MCL (ref 0.1–1.3)
MONOCYTES NFR BLD AUTO: 5.3 %
NEUTROPHILS # BLD AUTO: 9.54 X10(3)/MCL (ref 2.1–9.2)
NEUTROPHILS NFR BLD AUTO: 81.7 %
PLATELET # BLD AUTO: 73 X10(3)/MCL (ref 130–400)
PMV BLD AUTO: 12 FL (ref 7.4–10.4)
POTASSIUM SERPL-SCNC: 3.8 MMOL/L (ref 3.5–5.1)
PROT SERPL-MCNC: 6.3 GM/DL (ref 6.4–8.3)
RBC # BLD AUTO: 3.78 X10(6)/MCL (ref 4.2–5.4)
SODIUM SERPL-SCNC: 136 MMOL/L (ref 136–145)
WBC # SPEC AUTO: 11.68 X10(3)/MCL (ref 4.5–11.5)

## 2024-05-02 PROCEDURE — 1159F MED LIST DOCD IN RCRD: CPT | Mod: CPTII,S$GLB,, | Performed by: NURSE PRACTITIONER

## 2024-05-02 PROCEDURE — 3074F SYST BP LT 130 MM HG: CPT | Mod: CPTII,S$GLB,, | Performed by: NURSE PRACTITIONER

## 2024-05-02 PROCEDURE — 85025 COMPLETE CBC W/AUTO DIFF WBC: CPT

## 2024-05-02 PROCEDURE — 36415 COLL VENOUS BLD VENIPUNCTURE: CPT

## 2024-05-02 PROCEDURE — 1160F RVW MEDS BY RX/DR IN RCRD: CPT | Mod: CPTII,S$GLB,, | Performed by: NURSE PRACTITIONER

## 2024-05-02 PROCEDURE — 99999 PR PBB SHADOW E&M-EST. PATIENT-LVL IV: CPT | Mod: PBBFAC,,, | Performed by: NURSE PRACTITIONER

## 2024-05-02 PROCEDURE — 3008F BODY MASS INDEX DOCD: CPT | Mod: CPTII,S$GLB,, | Performed by: NURSE PRACTITIONER

## 2024-05-02 PROCEDURE — 3078F DIAST BP <80 MM HG: CPT | Mod: CPTII,S$GLB,, | Performed by: NURSE PRACTITIONER

## 2024-05-02 PROCEDURE — 80053 COMPREHEN METABOLIC PANEL: CPT

## 2024-05-02 PROCEDURE — 99214 OFFICE O/P EST MOD 30 MIN: CPT | Mod: S$GLB,,, | Performed by: NURSE PRACTITIONER

## 2024-05-02 NOTE — PROGRESS NOTES
Subjective:       Patient ID: Daryl Musa is a 33 y.o. female.    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  Factor V Leiden heterozygous mutation  History of bilateral pulmonary emboli at age 17 provoked by oral contraceptive pills.    Current Treatment:   Prophylactic Lovenox.     Treatment History:  Lovenox with bridge to warfarin, continued warfarin for 9 months in 2008.    HPI:  33-year-old female who was started on oral contraceptive pills in 2008 at the age of 17, she then developed bilateral pulmonary emboli.  At that time, she was checked for DVT, this was negative.  She underwent hypercoagulable workup that showed that she was heterozygous for factor 5 Leiden.  She was started on Lovenox and bridged to warfarin.  She then treated with warfarin for 9 months.  After that, she was done with therapy for a provoked blood clot.  In October of 2020, the patient had a CBC that was normal including a normal platelet count.  She was then diagnosed with COVID-19 on multiple occasions, including 08/17/2022.  On 11/17/2022, she had a CBC that revealed a platelet count of 78,000. Repeat on 11/29/2022 showed increase in her platelet count, however she still had thrombocytopenia with a platelet count of 108,000. The patient was on baby aspirin at that time, stopped taking baby aspirin and had a repeat CBC on 01/04/2023 that showed normalization of her platelet count.    Patient subsequently became pregnant, to her factor 5 Leiden heterozygous state, she was started on prophylactic Lovenox.  She  underwent blood work on 01/24/2024 that revealed a platelet count down to 84,000.  The rest of the CBC was within normal limits.  Due to her likely ITP along with a factor 5 Leiden heterozygous state, she was referred to Hematology.  I saw her on 02/07/2024.  At that visit, she stated that overall she was doing well and she had no major issues to discuss.   She denied any bleeding or bruising.    Interval History:   Patient presents to clinic for scheduled follow up appointment.  Overall she was doing well, She is currently 24 weeks pregnant. She denies any abnormal bleeding. She does report fatigue and heartburn. She continues lovenox 40 mg daily.      Past Medical History:   Diagnosis Date    Anxiety     Dyspareunia     Endometriosis, unspecified     Factor 5 Leiden mutation, heterozygous     Fibroid     Infertility, female     Migraine     Obesity, unspecified     Other pulmonary embolism without acute cor pulmonale       Past Surgical History:   Procedure Laterality Date    bunionectomy left foot Left     CERVICAL BIOPSY  W/ LOOP ELECTRODE EXCISION      LAPAROSCOPY      Dx:Emosis    LYSIS OF ADHESIONS  10/16/2019    MOUTH SURGERY      WISDOM TOOTH EXTRACTION       Social History     Socioeconomic History    Marital status:      Spouse name: melissa    Number of children: 0   Occupational History    Occupation: [employer] Tech   Tobacco Use    Smoking status: Former     Current packs/day: 0.00     Average packs/day: 0.3 packs/day for 10.0 years (2.5 ttl pk-yrs)     Types: Cigarettes     Start date:      Quit date:      Years since quittin.3    Smokeless tobacco: Never    Tobacco comments:     Quit 1-1/2 years ago.   Substance and Sexual Activity    Alcohol use: Not Currently     Comment: 1-2 times a month    Drug use: Never    Sexual activity: Yes     Partners: Male     Birth control/protection: None     Social Determinants of Health     Financial Resource Strain: Low Risk  (2023)    Overall Financial Resource Strain (CARDIA)     Difficulty of Paying Living Expenses: Not hard at all   Food Insecurity: No Food Insecurity (2023)    Hunger Vital Sign     Worried About Running Out of Food in the Last Year: Never true     Ran Out of Food in the Last Year: Never true   Transportation Needs: No Transportation Needs (2023)    PRAPARE -  Transportation     Lack of Transportation (Medical): No     Lack of Transportation (Non-Medical): No   Physical Activity: Insufficiently Active (12/11/2023)    Exercise Vital Sign     Days of Exercise per Week: 3 days     Minutes of Exercise per Session: 40 min   Stress: No Stress Concern Present (12/11/2023)    [employer] - Occupational Stress Questionnaire     Feeling of Stress : Not at all   Housing Stability: Low Risk  (12/11/2023)    Housing Stability Vital Sign     Unable to Pay for Housing in the Last Year: No     Number of Places Lived in the Last Year: 1     Unstable Housing in the Last Year: No      Family History   Problem Relation Name Age of Onset    Cancer Mother Sejal Woods     Breast cancer Mother Sejal Woods     Migraines Mother Sejal Woods     No Known Problems Sister        Review of patient's allergies indicates:   Allergen Reactions    Hydrocodone-acetaminophen Hives, Itching and Nausea And Vomiting    Adhesive Other (See Comments)     sensitivity to bandaids and surgical drapes    Hydrocodone Rash and Nausea And Vomiting      Review of Systems   Constitutional:  Negative for appetite change and unexpected weight change.   HENT:  Negative for mouth sores.    Eyes:  Negative for visual disturbance.   Respiratory:  Negative for cough and shortness of breath.    Cardiovascular:  Negative for chest pain.   Gastrointestinal:  Negative for abdominal pain and diarrhea.   Genitourinary:  Positive for frequency.   Musculoskeletal:  Positive for back pain.   Integumentary:  Negative for rash.   Neurological:  Negative for headaches.   Hematological:  Negative for adenopathy.   Psychiatric/Behavioral:  The patient is not nervous/anxious.          Objective:      Physical Exam  Vitals reviewed.   Constitutional:       General: She is not in acute distress.     Appearance: Normal appearance.   HENT:      Head: Normocephalic and atraumatic.      Nose: Nose normal.       Mouth/Throat:      Mouth: Mucous membranes are moist.   Eyes:      Extraocular Movements: Extraocular movements intact.      Conjunctiva/sclera: Conjunctivae normal.   Cardiovascular:      Rate and Rhythm: Normal rate and regular rhythm.      Pulses: Normal pulses.      Heart sounds: Normal heart sounds.   Pulmonary:      Effort: Pulmonary effort is normal.      Breath sounds: Normal breath sounds.   Musculoskeletal:         General: No swelling. Normal range of motion.      Cervical back: Normal range of motion and neck supple.      Right lower leg: No edema.      Left lower leg: No edema.   Skin:     General: Skin is warm and dry.   Neurological:      General: No focal deficit present.      Mental Status: She is alert and oriented to person, place, and time. Mental status is at baseline.   Psychiatric:         Mood and Affect: Mood normal.         Behavior: Behavior normal.         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  Factor V Leiden heterozygous mutation  History of bilateral pulmonary emboli at age 17 provoked by oral contraceptive pills.        Plan:       At this time, the patient has a factor 5 Leiden heterozygous mutation and likely ITP.    She is 24 weeks pregnant as of today.    I agree with prophylactic Lovenox at current dose.    Her CBC today, 05/2/2024 revealed a platelet count of 73,000.    Plan to see her every 4 weeks until she was about 36 weeks' gestation.  We will then plan to see her once every 2 weeks.  If she ever needed treatment to get her platelet count above 80,000 (currently at goal) so that she could receive an epidural, we would discuss treating with high-dose steroids and IVIG.    Return to clinic in 4 weeks with same day labs    SANGEETHA Youssef  Oncology/Hematology  [facility]

## 2024-05-09 DIAGNOSIS — O99.119 FACTOR V LEIDEN MUTATION AFFECTING PREGNANCY: ICD-10-CM

## 2024-05-09 DIAGNOSIS — O99.342 MENTAL DISORDER AFFECTING PREGNANCY IN SECOND TRIMESTER: Primary | ICD-10-CM

## 2024-05-09 DIAGNOSIS — D68.51 FACTOR V LEIDEN MUTATION AFFECTING PREGNANCY: ICD-10-CM

## 2024-05-16 ENCOUNTER — PROCEDURE VISIT (OUTPATIENT)
Dept: MATERNAL FETAL MEDICINE | Facility: CLINIC | Age: 34
End: 2024-05-16
Payer: COMMERCIAL

## 2024-05-16 ENCOUNTER — OFFICE VISIT (OUTPATIENT)
Dept: MATERNAL FETAL MEDICINE | Facility: CLINIC | Age: 34
End: 2024-05-16
Payer: COMMERCIAL

## 2024-05-16 VITALS
HEIGHT: 67 IN | SYSTOLIC BLOOD PRESSURE: 121 MMHG | HEART RATE: 102 BPM | DIASTOLIC BLOOD PRESSURE: 76 MMHG | WEIGHT: 214.06 LBS | BODY MASS INDEX: 33.6 KG/M2

## 2024-05-16 DIAGNOSIS — O35.EXX0 PYELECTASIS OF FETUS ON PRENATAL ULTRASOUND: ICD-10-CM

## 2024-05-16 DIAGNOSIS — O09.512 PRIMIGRAVIDA OF ADVANCED MATERNAL AGE IN SECOND TRIMESTER: Primary | ICD-10-CM

## 2024-05-16 DIAGNOSIS — O99.119 THROMBOCYTOPENIA AFFECTING PREGNANCY: ICD-10-CM

## 2024-05-16 DIAGNOSIS — D68.51 FACTOR V LEIDEN MUTATION AFFECTING PREGNANCY: ICD-10-CM

## 2024-05-16 DIAGNOSIS — O99.342 MENTAL DISORDER AFFECTING PREGNANCY IN SECOND TRIMESTER: ICD-10-CM

## 2024-05-16 DIAGNOSIS — O34.42: ICD-10-CM

## 2024-05-16 DIAGNOSIS — O99.119 FACTOR V LEIDEN MUTATION AFFECTING PREGNANCY: ICD-10-CM

## 2024-05-16 DIAGNOSIS — D69.6 THROMBOCYTOPENIA AFFECTING PREGNANCY: ICD-10-CM

## 2024-05-16 PROCEDURE — 1159F MED LIST DOCD IN RCRD: CPT | Mod: CPTII,S$GLB,, | Performed by: OBSTETRICS & GYNECOLOGY

## 2024-05-16 PROCEDURE — 3008F BODY MASS INDEX DOCD: CPT | Mod: CPTII,S$GLB,, | Performed by: OBSTETRICS & GYNECOLOGY

## 2024-05-16 PROCEDURE — 76816 OB US FOLLOW-UP PER FETUS: CPT | Mod: S$GLB,,, | Performed by: OBSTETRICS & GYNECOLOGY

## 2024-05-16 PROCEDURE — 3078F DIAST BP <80 MM HG: CPT | Mod: CPTII,S$GLB,, | Performed by: OBSTETRICS & GYNECOLOGY

## 2024-05-16 PROCEDURE — 3074F SYST BP LT 130 MM HG: CPT | Mod: CPTII,S$GLB,, | Performed by: OBSTETRICS & GYNECOLOGY

## 2024-05-16 PROCEDURE — 99213 OFFICE O/P EST LOW 20 MIN: CPT | Mod: S$GLB,,, | Performed by: OBSTETRICS & GYNECOLOGY

## 2024-05-16 PROCEDURE — 1160F RVW MEDS BY RX/DR IN RCRD: CPT | Mod: CPTII,S$GLB,, | Performed by: OBSTETRICS & GYNECOLOGY

## 2024-05-16 NOTE — ASSESSMENT & PLAN NOTE
24- Mild bilateral renal pelvic dilation measuring 6.4mm on the left and 5.5mm on the right was noted consistent with UTDA1. The kidneys appear normal as does the bladder. She has a low risk cfDNA and this is a female fetus   The finding of mild renal pelvis dilation was discussed with the patient. We reviewed basic anatomy of the renal collecting system and then discussed possible etiologies for renal pelvis dilation. When dilation of the renal pelvis is borderline or mild, most cases will resolve spontaneously. However, if the renal dilation persists in the  period, the most common conditions that can cause renal pelvis dilation are UPJ obstruction (ureteropelvic junction), UVJ obstruction (ureterovesical junction) and VUR (vesicoureteral reflux). These conditions predispose infants/children to urinary tract infection, but can be effectively followed and treated. I explained that the vast majority of infants with these findings will have spontaneous resolution during pregnancy. We will plan to re-evaluate the kidneys at her next office visit.   24- Left 6.8mm, Right 5.4mm. No evidence of megaureter. Normal appearing kidneys and bladder. Will continue to monitor.      Plan: Sun protection and Sun precautions advised Detail Level: Zone Continue Regimen: Tri-Gudelia as directed, patient was counseled that she does need to take breaks while using the topical medication.

## 2024-05-16 NOTE — ASSESSMENT & PLAN NOTE
Thrombocytopenia is a complex process leading to isolated thrombocytopenia (platelet count < 100 K/?L in absence of other etiologies. ITP can be primary (acquired immune-mediated disorder) or secondary (associated with underlying disease or drug exposure). The patient was counseled regarding the risks of ITP in pregnancy which include but are not limited to: mild to moderate bleeding (cutaneous or mucosal bleeding/both), risk of anesthetic complications (epidural hematoma), maternal hemorrhage (risk is higher in women with severe ITP-platelet count < 50 K/?L), fetal thrombocytopenia, and  thrombocytopenia (25% risk). Maternal platelet count does not predict the risk of  thrombocytopenia at birth. Severe fetal thrombocytopenia resulting in severe hemorrhage complications is rare (less than 1%). The  platelet count usually nadirs within the first 2 weeks of life. Assessing for antiplatelet antibodies is not indicated as the presence or absence of these is not useful in clinical management.    24 (@ Julia's office) - PLT 85K  3/6/24 (through Dr Dumont) - PLT 77K  24 ([place])- PLT 69K  24 (heme/onc)- PLT 73K    Recommendations:  Check CBC every 4 weeks; increase frequency of evaluation at 32 weeks to every 2 weeks, and weekly at 36 weeks and beyond (will obtain through Hematology - Dr Dumont)  Continue care with Hematology/Oncology  If platelet count is > 50 K/?Land patient is asymptomatic, no treatment is indicated  Treatment is indicated:  For patients with a platelet count is < 30 K/?L   For patients who have symptomatic bleeding  For patients with a platelet count < 50 K/?L who are anticipated to undergo a  delivery  For patients with platelet count < 70 - 80 K/?L around the time of delivery to increase the likelihood of being able to safely receive neuraxial anesthesia   Anesthesia consultation is recommended around 30-32 weeks gestation  Platelet transfusion should be  administered:  For patients with a platelet count < 30 K/?L and undergoing vaginal delivery  For patients experiencing significant bleeding  For patients with a platelet count < 50 K/?L and undergoing  delivery  Treatment options:  First line: prednisone 0.5 - 2 mg/kg/day (response is usually seen within 4-14 days)/alternative is dexamethasone burst (40 mg POD daily x 4 days)  Stress dosed steroids are recommended at time of delivery in patients receiving prednisone ? 10 mg daily (or steroid equivalent) for ? 21 days   IVIG can be considered for refractory cases or when rapid increase is necessary in consultation with Hematology/Oncology  Splenectomy can be considered in extreme circumstances, if recommended by Hematology/Oncology (this is best accomplished in second trimester) - N/A  Ensure appropriate vaccinations (for pneumococcus, HIB, and meningococcus)  Avoid NSAIDs and aspirin (she has stopped ASA)  If candidate for anticoagulation (ie prophylactic lovenox), discuss with Hematology/Oncology - Dr Dumont aware of Ppx Lovenox for Factor V/hx VTE  Check CBC on admission to L&D; type and crossmatch for prbc and transfuse platelets, if appropriate, as above  Mode of delivery per obstetric indications; however, would avoid use of fetal scalp electrode and avoid operative vaginal delivery (particularly vacuum assisted)  Notify pediatrics of maternal condition after birth as  will need platelet count checked and circumcision of male infant may need to be deferred

## 2024-05-16 NOTE — ASSESSMENT & PLAN NOTE
LEEP of the cervix has been associated with premature labor and  PROM. Most such deliveries are late  births (34-37 weeks), and many studies show no significant difference in the frequency of infants with birth weight <2500g.  Adverse outcomes may be associated with the  time interval from procedure to conception.    Cervical length measurement will be performed at the time of anatomy scan. Prophylactic cerclage placement solely for history of LEEP, has not been shown to provide benefit, and it is not recommended.    3/20/24- TVU CL >30mm.     Recommendations:  Baseline transvaginal ultrasound cervical length (TVU CL) at anatomy scan (19-20 weeks' gestation- scheduled at [facility])  If TVU CL ?30 mm, then routine prenatal care.

## 2024-05-16 NOTE — PROGRESS NOTES
"Maternal Fetal Medicine follow up consult    SUBJECTIVE:     Daryl Musa is a 33 y.o.  female with IUP at 26w2d who is seen in follow up consultation by M.  Pregnancy complications include:   Problem   - Pyelectasis of fetus on prenatal ultrasound   - Thrombocytopenia affecting pregnancy   - Pregnancy complicated by prior LEEP, antepartum, second trimester   - AMA in second trimester   - Factor V Leiden mutation affecting pregnancy     Daryl presents for routine follow up appointment.  Last hematology appt 24. Next scheduled 24. Last plt count was 73k.  Denies LOF, VB, contractions. Positive fetal movement.  She is considering elective  but we discussed in detail and she will consider vaginal birth. She was worried about the chance of blood clot while off her lovenox.    Previous notes reviewed.   No changes to medical, surgical, family, social, or obstetric history.    Interval history since last M visit: see above    Medications reviewed.    Care team members:  Dr Marshall - Primary OB  Dr Dumont - hematology     OBJECTIVE:   /76 (BP Location: Right arm)   Pulse 102   Ht 5' 7" (1.702 m)   Wt 97.1 kg (214 lb 1.1 oz)   LMP 2023 (Exact Date)   BMI 33.53 kg/m²     Ultrasound performed. See viewpoint for full ultrasound report.    A viable dia pregnancy is visualized in breech presentation.  Estimated fetal weight is at the 58th percentile with an abdominal circumference at the 72nd percentile. Bilateral renal pelvis dilation is noted measuring 6.8mm (left) and 5.4mm (right) consistent with UTDA1 (mild). Anatomy is complete.  Amniotic fluid volume is normal.  Placenta is anterior.      ASSESSMENT/PLAN:     33 y.o.  female with IUP at 26w2d    - AMA in second trimester  Previously counseled the patient on the relationship between maternal age and fetal aneuploidy as well as the association between advanced maternal age (AMA) and preeclampsia, gestational " diabetes, and fetal growth restriction.    NIPT low risk (46,XX)    Recommendations:  Detailed anatomic survey at 19-20 weeks    Follow-up fetal ultrasound at 32-34 weeks for interval fetal growth  Has discontinued d/t thrombocytopenia        - Factor V Leiden mutation affecting pregnancy  Results from an inherited thrombophilia evaluation indicate that she is heterozygous for the Factor V Leiden mutation. Approximately 3-8% of the general population is heterozygous for the R506Q mutation in the Factor V gene. Factor V Leiden is characterized by poor anticoagulant response to activated protein C and an increased risk for venous thrombosis. Factor V Leiden is inactivated at a rate approximately 10 times slower than normal Factor V and persists longer in circulation, resulting in an increased thrombin generation and a mild hypercoagulable state.    We discussed the meaning of genes and mutations in depth. She is aware that one of her Factor V genes does not work the way that it should, which causes an increased risk for blood clots. We have discussed inheritance patterns with Factor V Leiden. Since she is heterozygous, there is a 50% chance that each of her children could inherit this change, and subsequently be at risk to develop thromboses when they are adults. Factor V Leiden testing is not routinely offerred to children because this is not a disorder that would change their medical management during childhood, as the risks of treatment are greater than the risk of thrombosis.     She is understandably concerned about her health, as well as the health of this pregnancy. We discussed that during pregnancy there is a 7-16 fold increase in thrombotic risk in Factor V Leiden heterozygotes. Factor V Leiden heterozygotes and homozygotes have also been found to be at an increased risk for pregnancy loss, particularly during the 2nd and 3rd trimesters. There is also a controversial association between Factor V Leiden and  pre-eclampsia, growth restriction, and placental abruption due to poor placental perfusion.  Although these risks are increased significantly over the general population, is should be noted that the risk for pregnancy related venous thrombosis in asymptomatic FVL heterozygotes is approximately 0.2%. In individuals homozygous for FVL, the risk for venous thrombosis is approximately 15%. It is important for patients who have Factor V Leiden to be tested for other inherited or acquired thrombophilias such as prothrombin, methylenetetrahydrofolate reductase (MTHFR), anti-cardiolipin, phospholipid dependent coagulation assays for lupus inhibitor, protein C activity, and protein S activity. If abnormal values or mutations were to be found in any of the above listed tests, the risk for thrombosis will increase.      Recommendations:  Prophylactic anticoagulation during the antepartum period/ prophylactic anticoagulation for 6 weeks postpartum  For patients with a history of PE, consider maternal echocardiogram to assess for right heart strain    We recommend use of LMWH/enoxaparin over unfractionated heparin If the patient is unable to fill this medication, or if there is a high likelihood for need to reverse this medication, unfractionated heparin should be prescribed. She will continue anticoagulation until 6 weeks postpartum.  Prophylaxis:  For patients with a BMI =< 40, prescribe lovenox 40 mg daily    Peripartum Management  -YARA hose/SCDs should be used while anticoagulation is interrupted.  -Regardless of whether the patient is on prophylactic dose UFH or LMWH, the last dose should be 12 hours prior to neuraxial anesthesia. For this reason, we no longer recommend conversion to UFH at 36 weeks.   -In patients with neuraxial anesthesia: prophylactic anticoagulation should not be initiated within 12 hours of neuraxial blockade or within 4 hours of epidural removal, whichever is later.    Postpartum  Management  -Prophylactic anticoagulation (defer to timing related to neuraxial anesthesia)  After vaginal delivery: should be started no sooner than 6 hours  After  delivery: should be started no sooner than 12 hours  -Breastfeeding is compatible with warfarin, UFH, and LMWH.      - Pregnancy complicated by prior LEEP, antepartum, second trimester  LEEP of the cervix has been associated with premature labor and  PROM. Most such deliveries are late  births (34-37 weeks), and many studies show no significant difference in the frequency of infants with birth weight <2500g.  Adverse outcomes may be associated with the  time interval from procedure to conception.    Cervical length measurement will be performed at the time of anatomy scan. Prophylactic cerclage placement solely for history of LEEP, has not been shown to provide benefit, and it is not recommended.    3/20/24- TVU CL >30mm.     Recommendations:  Baseline transvaginal ultrasound cervical length (TVU CL) at anatomy scan (19-20 weeks' gestation- scheduled at [facility])  If TVU CL ?30 mm, then routine prenatal care.      - Thrombocytopenia affecting pregnancy  Thrombocytopenia is a complex process leading to isolated thrombocytopenia (platelet count < 100 K/?L in absence of other etiologies. ITP can be primary (acquired immune-mediated disorder) or secondary (associated with underlying disease or drug exposure). The patient was counseled regarding the risks of ITP in pregnancy which include but are not limited to: mild to moderate bleeding (cutaneous or mucosal bleeding/both), risk of anesthetic complications (epidural hematoma), maternal hemorrhage (risk is higher in women with severe ITP-platelet count < 50 K/?L), fetal thrombocytopenia, and  thrombocytopenia (25% risk). Maternal platelet count does not predict the risk of  thrombocytopenia at birth. Severe fetal thrombocytopenia resulting in severe hemorrhage complications  is rare (less than 1%). The  platelet count usually nadirs within the first 2 weeks of life. Assessing for antiplatelet antibodies is not indicated as the presence or absence of these is not useful in clinical management.    24 (@ Julia's office) - PLT 85K  3/6/24 (through Dr Dumont) - PLT 77K  24 ([place])- PLT 69K  24 (heme/onc)- PLT 73K    Recommendations:  Check CBC every 4 weeks; increase frequency of evaluation at 32 weeks to every 2 weeks, and weekly at 36 weeks and beyond (will obtain through Hematology - Dr Dumont)  Continue care with Hematology/Oncology  If platelet count is > 50 K/?Land patient is asymptomatic, no treatment is indicated  Treatment is indicated:  For patients with a platelet count is < 30 K/?L   For patients who have symptomatic bleeding  For patients with a platelet count < 50 K/?L who are anticipated to undergo a  delivery  For patients with platelet count < 70 - 80 K/?L around the time of delivery to increase the likelihood of being able to safely receive neuraxial anesthesia   Anesthesia consultation is recommended around 30-32 weeks gestation  Platelet transfusion should be administered:  For patients with a platelet count < 30 K/?L and undergoing vaginal delivery  For patients experiencing significant bleeding  For patients with a platelet count < 50 K/?L and undergoing  delivery  Treatment options:  First line: prednisone 0.5 - 2 mg/kg/day (response is usually seen within 4-14 days)/alternative is dexamethasone burst (40 mg POD daily x 4 days)  Stress dosed steroids are recommended at time of delivery in patients receiving prednisone ? 10 mg daily (or steroid equivalent) for ? 21 days   IVIG can be considered for refractory cases or when rapid increase is necessary in consultation with Hematology/Oncology  Splenectomy can be considered in extreme circumstances, if recommended by Hematology/Oncology (this is best accomplished in second trimester) -  N/A  Ensure appropriate vaccinations (for pneumococcus, HIB, and meningococcus)  Avoid NSAIDs and aspirin (she has stopped ASA)  If candidate for anticoagulation (ie prophylactic lovenox), discuss with Hematology/Oncology - Dr Dumont aware of Ppx Lovenox for Factor V/hx VTE  Check CBC on admission to L&D; type and crossmatch for prbc and transfuse platelets, if appropriate, as above  Mode of delivery per obstetric indications; however, would avoid use of fetal scalp electrode and avoid operative vaginal delivery (particularly vacuum assisted)  Notify pediatrics of maternal condition after birth as  will need platelet count checked and circumcision of male infant may need to be deferred      - Pyelectasis of fetus on prenatal ultrasound  24- Mild bilateral renal pelvic dilation measuring 6.4mm on the left and 5.5mm on the right was noted consistent with UTDA1. The kidneys appear normal as does the bladder. She has a low risk cfDNA and this is a female fetus   The finding of mild renal pelvis dilation was discussed with the patient. We reviewed basic anatomy of the renal collecting system and then discussed possible etiologies for renal pelvis dilation. When dilation of the renal pelvis is borderline or mild, most cases will resolve spontaneously. However, if the renal dilation persists in the  period, the most common conditions that can cause renal pelvis dilation are UPJ obstruction (ureteropelvic junction), UVJ obstruction (ureterovesical junction) and VUR (vesicoureteral reflux). These conditions predispose infants/children to urinary tract infection, but can be effectively followed and treated. I explained that the vast majority of infants with these findings will have spontaneous resolution during pregnancy. We will plan to re-evaluate the kidneys at her next office visit.   24- Left 6.8mm, Right 5.4mm. No evidence of megaureter. Normal appearing kidneys and bladder. Will continue to  monitor.       F/u in 4 weeks for MFM visit /growth ultrasound    Isabella Christensen MD  Maternal Fetal Medicine

## 2024-05-28 LAB — GLUCOSE, 1 HOUR: 197 MG/DL

## 2024-05-30 ENCOUNTER — OFFICE VISIT (OUTPATIENT)
Dept: HEMATOLOGY/ONCOLOGY | Facility: CLINIC | Age: 34
End: 2024-05-30
Payer: COMMERCIAL

## 2024-05-30 ENCOUNTER — LAB VISIT (OUTPATIENT)
Dept: LAB | Facility: HOSPITAL | Age: 34
End: 2024-05-30
Attending: INTERNAL MEDICINE
Payer: COMMERCIAL

## 2024-05-30 VITALS
BODY MASS INDEX: 33.59 KG/M2 | HEIGHT: 67 IN | WEIGHT: 214 LBS | SYSTOLIC BLOOD PRESSURE: 114 MMHG | OXYGEN SATURATION: 98 % | HEART RATE: 76 BPM | RESPIRATION RATE: 18 BRPM | DIASTOLIC BLOOD PRESSURE: 69 MMHG

## 2024-05-30 DIAGNOSIS — D69.6 THROMBOCYTOPENIA AFFECTING PREGNANCY: ICD-10-CM

## 2024-05-30 DIAGNOSIS — O99.119 FACTOR V LEIDEN MUTATION AFFECTING PREGNANCY: ICD-10-CM

## 2024-05-30 DIAGNOSIS — O99.119 FACTOR V LEIDEN MUTATION AFFECTING PREGNANCY: Primary | ICD-10-CM

## 2024-05-30 DIAGNOSIS — O99.119 THROMBOCYTOPENIA AFFECTING PREGNANCY: ICD-10-CM

## 2024-05-30 DIAGNOSIS — D68.51 FACTOR V LEIDEN MUTATION AFFECTING PREGNANCY: Primary | ICD-10-CM

## 2024-05-30 DIAGNOSIS — D68.51 FACTOR V LEIDEN MUTATION AFFECTING PREGNANCY: ICD-10-CM

## 2024-05-30 LAB
ALBUMIN SERPL-MCNC: 3 G/DL (ref 3.5–5)
ALBUMIN/GLOB SERPL: 1.1 RATIO (ref 1.1–2)
ALP SERPL-CCNC: 65 UNIT/L (ref 40–150)
ALT SERPL-CCNC: 10 UNIT/L (ref 0–55)
ANION GAP SERPL CALC-SCNC: 9 MEQ/L
AST SERPL-CCNC: 10 UNIT/L (ref 5–34)
BASOPHILS # BLD AUTO: 0.02 X10(3)/MCL
BASOPHILS NFR BLD AUTO: 0.2 %
BILIRUB SERPL-MCNC: 0.3 MG/DL
BUN SERPL-MCNC: 5.9 MG/DL (ref 7–18.7)
CALCIUM SERPL-MCNC: 8.4 MG/DL (ref 8.4–10.2)
CHLORIDE SERPL-SCNC: 107 MMOL/L (ref 98–107)
CO2 SERPL-SCNC: 19 MMOL/L (ref 22–29)
CREAT SERPL-MCNC: 0.61 MG/DL (ref 0.55–1.02)
CREAT/UREA NIT SERPL: 10
EOSINOPHIL # BLD AUTO: 0.08 X10(3)/MCL (ref 0–0.9)
EOSINOPHIL NFR BLD AUTO: 0.7 %
ERYTHROCYTE [DISTWIDTH] IN BLOOD BY AUTOMATED COUNT: 14.1 % (ref 11.5–17)
GFR SERPLBLD CREATININE-BSD FMLA CKD-EPI: >60 ML/MIN/1.73/M2
GLOBULIN SER-MCNC: 2.8 GM/DL (ref 2.4–3.5)
GLUCOSE SERPL-MCNC: 118 MG/DL (ref 74–100)
HCT VFR BLD AUTO: 31.8 % (ref 37–47)
HGB BLD-MCNC: 10.6 G/DL (ref 12–16)
IMM GRANULOCYTES # BLD AUTO: 0.07 X10(3)/MCL (ref 0–0.04)
IMM GRANULOCYTES NFR BLD AUTO: 0.6 %
LYMPHOCYTES # BLD AUTO: 1.66 X10(3)/MCL (ref 0.6–4.6)
LYMPHOCYTES NFR BLD AUTO: 13.6 %
MCH RBC QN AUTO: 28.6 PG (ref 27–31)
MCHC RBC AUTO-ENTMCNC: 33.3 G/DL (ref 33–36)
MCV RBC AUTO: 85.9 FL (ref 80–94)
MONOCYTES # BLD AUTO: 0.69 X10(3)/MCL (ref 0.1–1.3)
MONOCYTES NFR BLD AUTO: 5.7 %
NEUTROPHILS # BLD AUTO: 9.65 X10(3)/MCL (ref 2.1–9.2)
NEUTROPHILS NFR BLD AUTO: 79.2 %
PLATELET # BLD AUTO: 79 X10(3)/MCL (ref 130–400)
PMV BLD AUTO: 11.7 FL (ref 7.4–10.4)
POTASSIUM SERPL-SCNC: 3.6 MMOL/L (ref 3.5–5.1)
PROT SERPL-MCNC: 5.8 GM/DL (ref 6.4–8.3)
RBC # BLD AUTO: 3.7 X10(6)/MCL (ref 4.2–5.4)
SODIUM SERPL-SCNC: 135 MMOL/L (ref 136–145)
WBC # SPEC AUTO: 12.17 X10(3)/MCL (ref 4.5–11.5)

## 2024-05-30 PROCEDURE — 1159F MED LIST DOCD IN RCRD: CPT | Mod: CPTII,S$GLB,, | Performed by: INTERNAL MEDICINE

## 2024-05-30 PROCEDURE — 99213 OFFICE O/P EST LOW 20 MIN: CPT | Mod: S$GLB,,, | Performed by: INTERNAL MEDICINE

## 2024-05-30 PROCEDURE — 1160F RVW MEDS BY RX/DR IN RCRD: CPT | Mod: CPTII,S$GLB,, | Performed by: INTERNAL MEDICINE

## 2024-05-30 PROCEDURE — 80053 COMPREHEN METABOLIC PANEL: CPT

## 2024-05-30 PROCEDURE — 99999 PR PBB SHADOW E&M-EST. PATIENT-LVL III: CPT | Mod: PBBFAC,,, | Performed by: INTERNAL MEDICINE

## 2024-05-30 PROCEDURE — 85025 COMPLETE CBC W/AUTO DIFF WBC: CPT

## 2024-05-30 PROCEDURE — 3008F BODY MASS INDEX DOCD: CPT | Mod: CPTII,S$GLB,, | Performed by: INTERNAL MEDICINE

## 2024-05-30 PROCEDURE — 3078F DIAST BP <80 MM HG: CPT | Mod: CPTII,S$GLB,, | Performed by: INTERNAL MEDICINE

## 2024-05-30 PROCEDURE — 3074F SYST BP LT 130 MM HG: CPT | Mod: CPTII,S$GLB,, | Performed by: INTERNAL MEDICINE

## 2024-05-30 PROCEDURE — 36415 COLL VENOUS BLD VENIPUNCTURE: CPT

## 2024-05-30 NOTE — PROGRESS NOTES
Subjective:       Patient ID: Daryl Musa is a 33 y.o. female.    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  Factor V Leiden heterozygous mutation  History of bilateral pulmonary emboli at age 17 provoked by oral contraceptive pills.    Current Treatment:   Prophylactic Lovenox.     Treatment History:  Lovenox with bridge to warfarin, continued warfarin for 9 months in 2008.    HPI:  33-year-old female who was started on oral contraceptive pills in 2008 at the age of 17, she then developed bilateral pulmonary emboli.  At that time, she was checked for DVT, this was negative.  She underwent hypercoagulable workup that showed that she was heterozygous for factor 5 Leiden.  She was started on Lovenox and bridged to warfarin.  She then treated with warfarin for 9 months.  After that, she was done with therapy for a provoked blood clot.  In October of 2020, the patient had a CBC that was normal including a normal platelet count.  She was then diagnosed with COVID-19 on multiple occasions, including 08/17/2022.  On 11/17/2022, she had a CBC that revealed a platelet count of 78,000. Repeat on 11/29/2022 showed increase in her platelet count, however she still had thrombocytopenia with a platelet count of 108,000. The patient was on baby aspirin at that time, stopped taking baby aspirin and had a repeat CBC on 01/04/2023 that showed normalization of her platelet count.    Patient subsequently became pregnant, to her factor 5 Leiden heterozygous state, she was started on prophylactic Lovenox.  She  underwent blood work on 01/24/2024 that revealed a platelet count down to 84,000.  The rest of the CBC was within normal limits.  Due to her likely ITP along with a factor 5 Leiden heterozygous state, she was referred to Hematology.  I saw her on 02/07/2024.  At that visit, she stated that overall she was doing well and she had no major issues to discuss.   She denied any bleeding or bruising.    Interval History:   Patient presents to clinic for scheduled follow up appointment.  Overall she was doing well, She is currently 28 weeks pregnant. She denies any abnormal bleeding. She continues lovenox 40 mg daily.      Past Medical History:   Diagnosis Date    Anxiety     Dyspareunia     Endometriosis, unspecified     Factor 5 Leiden mutation, heterozygous     Fibroid     Infertility, female     Migraine     Obesity, unspecified     Other pulmonary embolism without acute cor pulmonale       Past Surgical History:   Procedure Laterality Date    bunionectomy left foot Left     CERVICAL BIOPSY  W/ LOOP ELECTRODE EXCISION      LAPAROSCOPY      Dx:Emosis    LYSIS OF ADHESIONS  10/16/2019    MOUTH SURGERY      WISDOM TOOTH EXTRACTION       Social History     Socioeconomic History    Marital status:      Spouse name: melissa    Number of children: 0   Occupational History    Occupation: [employer]   Tobacco Use    Smoking status: Former     Current packs/day: 0.00     Average packs/day: 0.3 packs/day for 10.0 years (2.5 ttl pk-yrs)     Types: Cigarettes     Start date:      Quit date:      Years since quittin.4    Smokeless tobacco: Never    Tobacco comments:     Quit 1-1/2 years ago.   Substance and Sexual Activity    Alcohol use: Not Currently     Comment: 1-2 times a month    Drug use: Never    Sexual activity: Yes     Partners: Male     Birth control/protection: None     Social Determinants of Health     Financial Resource Strain: Low Risk  (2023)    Overall Financial Resource Strain (CARDIA)     Difficulty of Paying Living Expenses: Not hard at all   Food Insecurity: No Food Insecurity (2023)    Hunger Vital Sign     Worried About Running Out of Food in the Last Year: Never true     Ran Out of Food in the Last Year: Never true   Transportation Needs: No Transportation Needs (2023)    PRAPARE - Transportation     Lack of Transportation  (Medical): No     Lack of Transportation (Non-Medical): No   Physical Activity: Insufficiently Active (12/11/2023)    Exercise Vital Sign     Days of Exercise per Week: 3 days     Minutes of Exercise per Session: 40 min   Stress: No Stress Concern Present (12/11/2023)    [place] [employer] - Occupational Stress Questionnaire     Feeling of Stress : Not at all   Housing Stability: Low Risk  (12/11/2023)    Housing Stability Vital Sign     Unable to Pay for Housing in the Last Year: No     Number of Places Lived in the Last Year: 1     Unstable Housing in the Last Year: No      Family History   Problem Relation Name Age of Onset    Cancer Mother Sejal Woods     Breast cancer Mother Sejal Woods     Migraines Mother Sejal Woods     No Known Problems Sister        Review of patient's allergies indicates:   Allergen Reactions    Hydrocodone-acetaminophen Hives, Itching and Nausea And Vomiting    Adhesive Other (See Comments)     sensitivity to bandaids and surgical drapes    Hydrocodone Rash and Nausea And Vomiting      Review of Systems   Constitutional:  Negative for appetite change and unexpected weight change.   HENT:  Negative for mouth sores.    Eyes:  Negative for visual disturbance.   Respiratory:  Negative for cough and shortness of breath.    Cardiovascular:  Negative for chest pain.   Gastrointestinal:  Negative for abdominal pain and diarrhea.   Genitourinary:  Negative for frequency.   Musculoskeletal:  Negative for back pain.   Integumentary:  Negative for rash.   Neurological:  Negative for headaches.   Hematological:  Negative for adenopathy.   Psychiatric/Behavioral:  The patient is not nervous/anxious.          Objective:      Physical Exam  Vitals reviewed.   Constitutional:       General: She is not in acute distress.     Appearance: Normal appearance.   HENT:      Head: Normocephalic and atraumatic.      Nose: Nose normal.      Mouth/Throat:      Mouth: Mucous  membranes are moist.   Eyes:      Extraocular Movements: Extraocular movements intact.      Conjunctiva/sclera: Conjunctivae normal.   Cardiovascular:      Rate and Rhythm: Normal rate and regular rhythm.      Pulses: Normal pulses.      Heart sounds: Normal heart sounds.   Pulmonary:      Effort: Pulmonary effort is normal.      Breath sounds: Normal breath sounds.   Musculoskeletal:         General: No swelling. Normal range of motion.      Cervical back: Normal range of motion and neck supple.      Right lower leg: No edema.      Left lower leg: No edema.   Skin:     General: Skin is warm and dry.   Neurological:      General: No focal deficit present.      Mental Status: She is alert and oriented to person, place, and time. Mental status is at baseline.   Psychiatric:         Mood and Affect: Mood normal.         Behavior: Behavior normal.         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  Factor V Leiden heterozygous mutation  History of bilateral pulmonary emboli at age 17 provoked by oral contraceptive pills.        Plan:       At this time, the patient has a factor 5 Leiden heterozygous mutation and likely ITP.    She is 28 weeks pregnant as of today.    I agree with prophylactic Lovenox at current dose.    Her CBC today, 05/30/2024 revealed a platelet count of 79,000.    Plan to see her every 4 weeks until she was about 36 weeks' gestation.  We will then plan to see her once every 2 weeks.  If she ever needed treatment to get her platelet count above 80,000 so that she could receive an epidural, we would discuss treating with high-dose steroids and IVIG.    Return to clinic in 4 weeks with same day labs    Castro Dumont II, MD  Oncology/Hematology  [facility]

## 2024-06-11 DIAGNOSIS — O99.119 FACTOR V LEIDEN MUTATION AFFECTING PREGNANCY: Primary | ICD-10-CM

## 2024-06-11 DIAGNOSIS — D68.51 FACTOR V LEIDEN MUTATION AFFECTING PREGNANCY: Primary | ICD-10-CM

## 2024-06-13 ENCOUNTER — OFFICE VISIT (OUTPATIENT)
Dept: MATERNAL FETAL MEDICINE | Facility: CLINIC | Age: 34
End: 2024-06-13
Payer: COMMERCIAL

## 2024-06-13 ENCOUNTER — PROCEDURE VISIT (OUTPATIENT)
Dept: MATERNAL FETAL MEDICINE | Facility: CLINIC | Age: 34
End: 2024-06-13
Payer: COMMERCIAL

## 2024-06-13 VITALS
BODY MASS INDEX: 33.65 KG/M2 | DIASTOLIC BLOOD PRESSURE: 74 MMHG | HEIGHT: 67 IN | SYSTOLIC BLOOD PRESSURE: 120 MMHG | HEART RATE: 105 BPM | WEIGHT: 214.38 LBS

## 2024-06-13 DIAGNOSIS — O99.119 FACTOR V LEIDEN MUTATION AFFECTING PREGNANCY: ICD-10-CM

## 2024-06-13 DIAGNOSIS — O24.410 DIET CONTROLLED GESTATIONAL DIABETES MELLITUS (GDM) IN THIRD TRIMESTER: ICD-10-CM

## 2024-06-13 DIAGNOSIS — Z98.890 H/O LEEP (LOOP ELECTROSURGICAL EXCISION PROCEDURE) OF CERVIX COMPLICATING PREGNANCY, THIRD TRIMESTER: ICD-10-CM

## 2024-06-13 DIAGNOSIS — D68.51 FACTOR V LEIDEN MUTATION AFFECTING PREGNANCY: ICD-10-CM

## 2024-06-13 DIAGNOSIS — O34.43 H/O LEEP (LOOP ELECTROSURGICAL EXCISION PROCEDURE) OF CERVIX COMPLICATING PREGNANCY, THIRD TRIMESTER: ICD-10-CM

## 2024-06-13 DIAGNOSIS — O35.EXX0 PYELECTASIS OF FETUS ON PRENATAL ULTRASOUND: ICD-10-CM

## 2024-06-13 DIAGNOSIS — D69.6 THROMBOCYTOPENIA AFFECTING PREGNANCY: ICD-10-CM

## 2024-06-13 DIAGNOSIS — O99.119 THROMBOCYTOPENIA AFFECTING PREGNANCY: ICD-10-CM

## 2024-06-13 DIAGNOSIS — O99.343 MENTAL DISORDER AFFECTING PREGNANCY IN THIRD TRIMESTER: Primary | ICD-10-CM

## 2024-06-13 DIAGNOSIS — O09.513 PRIMIGRAVIDA OF ADVANCED MATERNAL AGE IN THIRD TRIMESTER: ICD-10-CM

## 2024-06-13 PROCEDURE — 3078F DIAST BP <80 MM HG: CPT | Mod: CPTII,S$GLB,, | Performed by: OBSTETRICS & GYNECOLOGY

## 2024-06-13 PROCEDURE — 1159F MED LIST DOCD IN RCRD: CPT | Mod: CPTII,S$GLB,, | Performed by: OBSTETRICS & GYNECOLOGY

## 2024-06-13 PROCEDURE — 3074F SYST BP LT 130 MM HG: CPT | Mod: CPTII,S$GLB,, | Performed by: OBSTETRICS & GYNECOLOGY

## 2024-06-13 PROCEDURE — 1160F RVW MEDS BY RX/DR IN RCRD: CPT | Mod: CPTII,S$GLB,, | Performed by: OBSTETRICS & GYNECOLOGY

## 2024-06-13 PROCEDURE — 76816 OB US FOLLOW-UP PER FETUS: CPT | Mod: S$GLB,,, | Performed by: OBSTETRICS & GYNECOLOGY

## 2024-06-13 PROCEDURE — 3008F BODY MASS INDEX DOCD: CPT | Mod: CPTII,S$GLB,, | Performed by: OBSTETRICS & GYNECOLOGY

## 2024-06-13 PROCEDURE — 99214 OFFICE O/P EST MOD 30 MIN: CPT | Mod: S$GLB,,, | Performed by: OBSTETRICS & GYNECOLOGY

## 2024-06-13 RX ORDER — METFORMIN HYDROCHLORIDE 500 MG/1
500 TABLET ORAL NIGHTLY
Qty: 90 TABLET | Refills: 3 | Status: SHIPPED | OUTPATIENT
Start: 2024-06-13 | End: 2025-06-13

## 2024-06-13 RX ORDER — LANCETS
EACH MISCELLANEOUS 4 TIMES DAILY
COMMUNITY
Start: 2024-06-05

## 2024-06-13 RX ORDER — BLOOD SUGAR DIAGNOSTIC
STRIP MISCELLANEOUS 4 TIMES DAILY
COMMUNITY
Start: 2024-06-05

## 2024-06-13 NOTE — ASSESSMENT & PLAN NOTE
LEEP of the cervix has been associated with premature labor and  PROM. Most such deliveries are late  births (34-37 weeks), and many studies show no significant difference in the frequency of infants with birth weight <2500g.  Adverse outcomes may be associated with the  time interval from procedure to conception.    Cervical length measurement will be performed at the time of anatomy scan. Prophylactic cerclage placement solely for history of LEEP, has not been shown to provide benefit, and it is not recommended.    3/20/24- TVU CL >30mm.     Recommendations:  Baseline transvaginal ultrasound cervical length (TVU CL) at anatomy scan (19-20 weeks' gestation- scheduled at [facility])  If TVU CL ?30 mm, then routine prenatal care.

## 2024-06-13 NOTE — ASSESSMENT & PLAN NOTE
24- Mild bilateral renal pelvic dilation measuring 6.4mm on the left and 5.5mm on the right was noted consistent with UTDA1. The kidneys appear normal as does the bladder. She has a low risk cfDNA and this is a female fetus   The finding of mild renal pelvis dilation was discussed with the patient. We reviewed basic anatomy of the renal collecting system and then discussed possible etiologies for renal pelvis dilation. When dilation of the renal pelvis is borderline or mild, most cases will resolve spontaneously. However, if the renal dilation persists in the  period, the most common conditions that can cause renal pelvis dilation are UPJ obstruction (ureteropelvic junction), UVJ obstruction (ureterovesical junction) and VUR (vesicoureteral reflux). These conditions predispose infants/children to urinary tract infection, but can be effectively followed and treated. I explained that the vast majority of infants with these findings will have spontaneous resolution during pregnancy. We will plan to re-evaluate the kidneys at her next office visit.   24- Left 6.8mm, Right 5.4mm. No evidence of megaureter. Normal appearing kidneys and bladder. Will continue to monitor.   24- Left 10.6mm, right 5.3mm. No evidence of megaureter. Normal appearing kidneys and bladder. With increasing dilation in left renal pelvis, spontaneous resolution in third trimester is less likely. Recommend  evaluation.

## 2024-06-13 NOTE — ASSESSMENT & PLAN NOTE
Thrombocytopenia is a complex process leading to isolated thrombocytopenia (platelet count < 100 K/?L in absence of other etiologies. ITP can be primary (acquired immune-mediated disorder) or secondary (associated with underlying disease or drug exposure). The patient was counseled regarding the risks of ITP in pregnancy which include but are not limited to: mild to moderate bleeding (cutaneous or mucosal bleeding/both), risk of anesthetic complications (epidural hematoma), maternal hemorrhage (risk is higher in women with severe ITP-platelet count < 50 K/?L), fetal thrombocytopenia, and  thrombocytopenia (25% risk). Maternal platelet count does not predict the risk of  thrombocytopenia at birth. Severe fetal thrombocytopenia resulting in severe hemorrhage complications is rare (less than 1%). The  platelet count usually nadirs within the first 2 weeks of life. Assessing for antiplatelet antibodies is not indicated as the presence or absence of these is not useful in clinical management.    24 (@ [facility] office) - PLT 85K  3/6/24 (through Dr Dumont) - PLT 77K  24 ([facility])- PLT 69K  24 (heme/onc)- PLT 73K  24 - 28w (heme/onc)- PLT 79K    Recommendations:  Check CBC every 4 weeks; increase frequency of evaluation at 32 weeks to every 2 weeks, and weekly at 36 weeks and beyond (will obtain through Hematology - Dr Dumont)  Continue care with Hematology/Oncology  If platelet count is > 50 K/?Land patient is asymptomatic, no treatment is indicated  Treatment is indicated:  For patients with a platelet count is < 30 K/?L   For patients who have symptomatic bleeding  For patients with a platelet count < 50 K/?L who are anticipated to undergo a  delivery  For patients with platelet count < 70 - 80 K/?L around the time of delivery to increase the likelihood of being able to safely receive neuraxial anesthesia   Anesthesia consultation is recommended around 30-32 weeks  gestation  Platelet transfusion should be administered:  For patients with a platelet count < 30 K/?L and undergoing vaginal delivery  For patients experiencing significant bleeding  For patients with a platelet count < 50 K/?L and undergoing  delivery  Treatment options:  First line: prednisone 0.5 - 2 mg/kg/day (response is usually seen within 4-14 days)/alternative is dexamethasone burst (40 mg POD daily x 4 days)  Stress dosed steroids are recommended at time of delivery in patients receiving prednisone ? 10 mg daily (or steroid equivalent) for ? 21 days   IVIG can be considered for refractory cases or when rapid increase is necessary in consultation with Hematology/Oncology  Splenectomy can be considered in extreme circumstances, if recommended by Hematology/Oncology (this is best accomplished in second trimester) - N/A  Ensure appropriate vaccinations (for pneumococcus, HIB, and meningococcus)  Avoid NSAIDs and aspirin (she has stopped ASA)  If candidate for anticoagulation (ie prophylactic lovenox), discuss with Hematology/Oncology - Dr Dumont aware of Ppx Lovenox for Factor V/hx VTE  Check CBC on admission to L&D; type and crossmatch for prbc and transfuse platelets, if appropriate, as above  Mode of delivery per obstetric indications; however, would avoid use of fetal scalp electrode and avoid operative vaginal delivery (particularly vacuum assisted)  Notify pediatrics of maternal condition after birth as  will need platelet count checked and circumcision of male infant may need to be deferred

## 2024-06-13 NOTE — PROGRESS NOTES
"Maternal Fetal Medicine follow up consult    SUBJECTIVE:     Daryl Musa is a 33 y.o.  female with IUP at 30w2d who is seen in follow up consultation by MFM.  Pregnancy complications include:   Problem   - Diet controlled gestational diabetes mellitus (GDM) in third trimester   - Pyelectasis of fetus on prenatal ultrasound   - Thrombocytopenia affecting pregnancy   - H/O LEEP (loop electrosurgical excision procedure) of cervix complicating pregnancy, third trimester   - Primigravida of advanced maternal age in third trimester   - Factor V Leiden mutation affecting pregnancy   - Anxiety/depression affecting pregnancy in third trimester     Daryl presents for routine follow up appointment.  Since her last office visit, she's been diagnosed with gDM. She has a sugar log and food diary for review. She has a distant family history of DM.   She is having some nausea after dinner which prevents a snack- she is asking to restart her PPI which I approved.  Continuing close follow up with heme/onc  Denies LOF, VB, contractions. Positive fetal movement.    Previous notes reviewed.   No changes to medical, surgical, family, social, or obstetric history.  Interval history since last MFM visit: see above  Medications reviewed.    Care team members:  Dr Marshall - Primary OB     OBJECTIVE:   /74 (BP Location: Right arm)   Pulse 105   Ht 5' 7" (1.702 m)   Wt 97.3 kg (214 lb 6.4 oz)   LMP 2023 (Exact Date)   BMI 33.58 kg/m²     Ultrasound performed. See viewpoint for full ultrasound report.    A viable dia pregnancy is visualized in cephalic presentation.  Estimated fetal weight is at the 58th percentile with an abdominal circumference at the 88th percentile ( overall 2 weeks ahead).   Left renal hydronephrosis is present measuring 1.06cm. The right kidney has normal appearance. No new abnormalities are noted. Amniotic fluid volume is normal.  Placenta is anterior.        ASSESSMENT/PLAN:     33 " y.o.  female with IUP at 30w2d    - Anxiety/depression affecting pregnancy in third trimester  She reports a history of anxiety and depression. She is taking Zoloft 50mg daily. She reports improvement of symptoms with the utilization of this medication regimen. Discussed increased risk for peripartum and postpartum mood disorders. Precautions provided.      We discussed the usage of SSRIs in pregnancy and the minimal risks associated with the fetus.      We have discussed the importance of optimization of mental health conditions during pregnancy in an effort to reduce the risk of postpartum mood disorders. We have discussed options for management including psychotherapy, counseling, cognitive behavioral therapy, exercise/yoga, journaling, and psychiatry services. She will notify our office if she is interested in being referred for any of the above.      - Thrombocytopenia affecting pregnancy  Thrombocytopenia is a complex process leading to isolated thrombocytopenia (platelet count < 100 K/?L in absence of other etiologies. ITP can be primary (acquired immune-mediated disorder) or secondary (associated with underlying disease or drug exposure). The patient was counseled regarding the risks of ITP in pregnancy which include but are not limited to: mild to moderate bleeding (cutaneous or mucosal bleeding/both), risk of anesthetic complications (epidural hematoma), maternal hemorrhage (risk is higher in women with severe ITP-platelet count < 50 K/?L), fetal thrombocytopenia, and  thrombocytopenia (25% risk). Maternal platelet count does not predict the risk of  thrombocytopenia at birth. Severe fetal thrombocytopenia resulting in severe hemorrhage complications is rare (less than 1%). The  platelet count usually nadirs within the first 2 weeks of life. Assessing for antiplatelet antibodies is not indicated as the presence or absence of these is not useful in clinical  management.    24 (@ [facility] office) - PLT 85K  3/6/24 (through Dr Dumont) - PLT 77K  24 ([facility])- PLT 69K  24 (heme/onc)- PLT 73K  24 - 28w (heme/onc)- PLT 79K    Recommendations:  Check CBC every 4 weeks; increase frequency of evaluation at 32 weeks to every 2 weeks, and weekly at 36 weeks and beyond (will obtain through Hematology - Dr Dumont)  Continue care with Hematology/Oncology  If platelet count is > 50 K/?Land patient is asymptomatic, no treatment is indicated  Treatment is indicated:  For patients with a platelet count is < 30 K/?L   For patients who have symptomatic bleeding  For patients with a platelet count < 50 K/?L who are anticipated to undergo a  delivery  For patients with platelet count < 70 - 80 K/?L around the time of delivery to increase the likelihood of being able to safely receive neuraxial anesthesia   Anesthesia consultation is recommended around 30-32 weeks gestation  Platelet transfusion should be administered:  For patients with a platelet count < 30 K/?L and undergoing vaginal delivery  For patients experiencing significant bleeding  For patients with a platelet count < 50 K/?L and undergoing  delivery  Treatment options:  First line: prednisone 0.5 - 2 mg/kg/day (response is usually seen within 4-14 days)/alternative is dexamethasone burst (40 mg POD daily x 4 days)  Stress dosed steroids are recommended at time of delivery in patients receiving prednisone ? 10 mg daily (or steroid equivalent) for ? 21 days   IVIG can be considered for refractory cases or when rapid increase is necessary in consultation with Hematology/Oncology  Splenectomy can be considered in extreme circumstances, if recommended by Hematology/Oncology (this is best accomplished in second trimester) - N/A  Ensure appropriate vaccinations (for pneumococcus, HIB, and meningococcus)  Avoid NSAIDs and aspirin (she has stopped ASA)  If candidate for anticoagulation (ie prophylactic  lovenox), discuss with Hematology/Oncology - Dr Dumont aware of Ppx Lovenox for Factor V/hx VTE  Check CBC on admission to L&D; type and crossmatch for prbc and transfuse platelets, if appropriate, as above  Mode of delivery per obstetric indications; however, would avoid use of fetal scalp electrode and avoid operative vaginal delivery (particularly vacuum assisted)  Notify pediatrics of maternal condition after birth as  will need platelet count checked and circumcision of male infant may need to be deferred      - Pyelectasis of fetus on prenatal ultrasound  24- Mild bilateral renal pelvic dilation measuring 6.4mm on the left and 5.5mm on the right was noted consistent with UTDA1. The kidneys appear normal as does the bladder. She has a low risk cfDNA and this is a female fetus   The finding of mild renal pelvis dilation was discussed with the patient. We reviewed basic anatomy of the renal collecting system and then discussed possible etiologies for renal pelvis dilation. When dilation of the renal pelvis is borderline or mild, most cases will resolve spontaneously. However, if the renal dilation persists in the  period, the most common conditions that can cause renal pelvis dilation are UPJ obstruction (ureteropelvic junction), UVJ obstruction (ureterovesical junction) and VUR (vesicoureteral reflux). These conditions predispose infants/children to urinary tract infection, but can be effectively followed and treated. I explained that the vast majority of infants with these findings will have spontaneous resolution during pregnancy. We will plan to re-evaluate the kidneys at her next office visit.   24- Left 6.8mm, Right 5.4mm. No evidence of megaureter. Normal appearing kidneys and bladder. Will continue to monitor.   24- Left 10.6mm, right 5.3mm. No evidence of megaureter. Normal appearing kidneys and bladder. With increasing dilation in left renal pelvis, spontaneous resolution  in third trimester is less likely. Recommend  evaluation.    - H/O LEEP (loop electrosurgical excision procedure) of cervix complicating pregnancy, third trimester  LEEP of the cervix has been associated with premature labor and  PROM. Most such deliveries are late  births (34-37 weeks), and many studies show no significant difference in the frequency of infants with birth weight <2500g.  Adverse outcomes may be associated with the  time interval from procedure to conception.    Cervical length measurement will be performed at the time of anatomy scan. Prophylactic cerclage placement solely for history of LEEP, has not been shown to provide benefit, and it is not recommended.    3/20/24- TVU CL >30mm.     Recommendations:  Baseline transvaginal ultrasound cervical length (TVU CL) at anatomy scan (19-20 weeks' gestation- scheduled at [facility])  If TVU CL ?30 mm, then routine prenatal care.      - Factor V Leiden mutation affecting pregnancy  Results from an inherited thrombophilia evaluation indicate that she is heterozygous for the Factor V Leiden mutation. Approximately 3-8% of the general population is heterozygous for the [id number] mutation in the Factor V gene. Factor V Leiden is characterized by poor anticoagulant response to activated protein C and an increased risk for venous thrombosis. Factor V Leiden is inactivated at a rate approximately 10 times slower than normal Factor V and persists longer in circulation, resulting in an increased thrombin generation and a mild hypercoagulable state.    We discussed the meaning of genes and mutations in depth. She is aware that one of her Factor V genes does not work the way that it should, which causes an increased risk for blood clots. We have discussed inheritance patterns with Factor V Leiden. Since she is heterozygous, there is a 50% chance that each of her children could inherit this change, and subsequently be at risk to develop thromboses when  they are adults. Factor V Leiden testing is not routinely offerred to children because this is not a disorder that would change their medical management during childhood, as the risks of treatment are greater than the risk of thrombosis.     She is understandably concerned about her health, as well as the health of this pregnancy. We discussed that during pregnancy there is a 7-16 fold increase in thrombotic risk in Factor V Leiden heterozygotes. Factor V Leiden heterozygotes and homozygotes have also been found to be at an increased risk for pregnancy loss, particularly during the 2nd and 3rd trimesters. There is also a controversial association between Factor V Leiden and pre-eclampsia, growth restriction, and placental abruption due to poor placental perfusion.  Although these risks are increased significantly over the general population, is should be noted that the risk for pregnancy related venous thrombosis in asymptomatic FVL heterozygotes is approximately 0.2%. In individuals homozygous for FVL, the risk for venous thrombosis is approximately 15%. It is important for patients who have Factor V Leiden to be tested for other inherited or acquired thrombophilias such as prothrombin, methylenetetrahydrofolate reductase (MTHFR), anti-cardiolipin, phospholipid dependent coagulation assays for lupus inhibitor, protein C activity, and protein S activity. If abnormal values or mutations were to be found in any of the above listed tests, the risk for thrombosis will increase.      Recommendations:  Prophylactic anticoagulation during the antepartum period/ prophylactic anticoagulation for 6 weeks postpartum  For patients with a history of PE, consider maternal echocardiogram to assess for right heart strain    We recommend use of LMWH/enoxaparin over unfractionated heparin If the patient is unable to fill this medication, or if there is a high likelihood for need to reverse this medication, unfractionated heparin  should be prescribed. She will continue anticoagulation until 6 weeks postpartum.  Prophylaxis:  For patients with a BMI =< 40, prescribe lovenox 40 mg daily    Peripartum Management  -YARA hose/SCDs should be used while anticoagulation is interrupted.  -Regardless of whether the patient is on prophylactic dose UFH or LMWH, the last dose should be 12 hours prior to neuraxial anesthesia. For this reason, we no longer recommend conversion to UFH at 36 weeks.   -In patients with neuraxial anesthesia: prophylactic anticoagulation should not be initiated within 12 hours of neuraxial blockade or within 4 hours of epidural removal, whichever is later.    Postpartum Management  -Prophylactic anticoagulation (defer to timing related to neuraxial anesthesia)  After vaginal delivery: should be started no sooner than 6 hours  After  delivery: should be started no sooner than 12 hours  -Breastfeeding is compatible with warfarin, UFH, and LMWH.      - Primigravida of advanced maternal age in third trimester  Previously counseled the patient on the relationship between maternal age and fetal aneuploidy as well as the association between advanced maternal age (AMA) and preeclampsia, gestational diabetes, and fetal growth restriction.    NIPT low risk (46,XX)    Recommendations:  Detailed anatomic survey at 19-20 weeks    Follow-up fetal ultrasound at 32-34 weeks for interval fetal growth  Has discontinued d/t thrombocytopenia        - Diet controlled gestational diabetes mellitus (GDM) in third trimester  I counseled the patient regarding the risks of gestational diabetes, which include macrosomia, hypertensive disorders of pregnancy,  hypoglycemia, shoulder dystocia/birth trauma, polyhydramnios, and increased risk of  delivery.  Patients requiring medical therapy have an increased risk of stillbirth.  Discussed that  outcome is linked to her ability to achieve glycemic control.  The goal of  treatment is to have a fasting blood sugar between 70 and 95 mg/dL and 2 hour postprandials less than 120 mg/dL.  Therapy will likely consist of therapy with metformin or insulin. Approximately 30-50% of the time, women who are well-controlled on metformin may require the addition of insulin as the pregnancy progresses. Glyburide therapy has demonstrated inferior results as compared to metformin and insulin, and therefore, is not a first-line choice. Antepartum testing is indicated for those patients requiring medical therapy to reduce the incidence of fetal demise. We discussed dietary counseling and appropriate exercise to improve peripheral insulin resistance. We discussed the frequency of blood sugar monitoring and goal blood sugars. She has met with a diabetic educator this pregnancy. I would recommend obtaining serial growth ultrasounds in the third trimester to monitor for macrosomia.    Most women with GDM are cured by delivery. All medications can be stopped postpartum. However, some women with GDM have undiagnosed type 2 diabetes. Therefore, I recommend obtaining a postpartum fasting blood sugar prior to discharge. Approximately 20% of women with GDM will have glucose intolerance or type 2 DM at the postpartum visit. A 2 hour glucose tolerance test should be performed 6-8 weeks postpartum. If the patient is breastfeeding, it is reasonable to delay this as appropriate. Additionally, women with GDM are at increased risk of developing type 2 DM later in life. Therefore, establishing with a primary MD who can perform annual diabetes screening is appropriate.     She is not on medication at this time. She presents with a log today which demonstrates persistently elevated fasting values.     Recommendations:  MFM will review blood sugars in 1 week via portal; We reinforced checking 4 x/day and reinforced goal blood sugars  Regimen: Metformin 500mg QHS  If medications are required, recommend growth scans every  4-6 weeks, starting at 28 weeks gestation  If medications are required, recommend starting antepartum testing at 32 weeks gestation (weekly NST+AFV or BPP); twice weekly testing is recommended if blood sugars are poorly controlled.   -Antepartum testing should be started at 40 weeks for women who do NOT require medications.  Check fasting blood sugar in hospital postpartum.   If normal, discontinue therapy and monitoring and recommend repeat postpartum glucose testing in 6-8 weeks postpartum using a 75 g oral glucose tolerance test.   If fasting blood glucose is between 100-125 mg/dl or impaired glucose tolerance is noted on 2 hour glucose test, refer to primary care as appropriate.   An ultrasound for estimated fetal weight should be obtained within 3 weeks of anticipated delivery; if the EFW is >= 4500 grams, a  should be offered.    Delivery timing:  Well controlled with diet: 39 0/7 - 40 6/7 weeks gestation  Oral agent or insulin required: 39 0/7 - 39 6/ 7 weeks gestation  Poorly controlled on oral agent or insulin: 38 0/7 - 38 6/7 weeks gestation      F/u in 4 weeks for MFM visit /growth ultrasound    Isabella Christensen MD  Maternal Fetal Medicine

## 2024-06-13 NOTE — ASSESSMENT & PLAN NOTE
I counseled the patient regarding the risks of gestational diabetes, which include macrosomia, hypertensive disorders of pregnancy,  hypoglycemia, shoulder dystocia/birth trauma, polyhydramnios, and increased risk of  delivery.  Patients requiring medical therapy have an increased risk of stillbirth.  Discussed that  outcome is linked to her ability to achieve glycemic control.  The goal of treatment is to have a fasting blood sugar between 70 and 95 mg/dL and 2 hour postprandials less than 120 mg/dL.  Therapy will likely consist of therapy with metformin or insulin. Approximately 30-50% of the time, women who are well-controlled on metformin may require the addition of insulin as the pregnancy progresses. Glyburide therapy has demonstrated inferior results as compared to metformin and insulin, and therefore, is not a first-line choice. Antepartum testing is indicated for those patients requiring medical therapy to reduce the incidence of fetal demise. We discussed dietary counseling and appropriate exercise to improve peripheral insulin resistance. We discussed the frequency of blood sugar monitoring and goal blood sugars. She has met with a diabetic educator this pregnancy. I would recommend obtaining serial growth ultrasounds in the third trimester to monitor for macrosomia.    Most women with GDM are cured by delivery. All medications can be stopped postpartum. However, some women with GDM have undiagnosed type 2 diabetes. Therefore, I recommend obtaining a postpartum fasting blood sugar prior to discharge. Approximately 20% of women with GDM will have glucose intolerance or type 2 DM at the postpartum visit. A 2 hour glucose tolerance test should be performed 6-8 weeks postpartum. If the patient is breastfeeding, it is reasonable to delay this as appropriate. Additionally, women with GDM are at increased risk of developing type 2 DM later in life. Therefore, establishing with a primary  MD who can perform annual diabetes screening is appropriate.     She is not on medication at this time. She presents with a log today which demonstrates persistently elevated fasting values.     Recommendations:  MFM will review blood sugars in 1 week via portal; We reinforced checking 4 x/day and reinforced goal blood sugars  Regimen: Metformin 500mg QHS  If medications are required, recommend growth scans every 4-6 weeks, starting at 28 weeks gestation  If medications are required, recommend starting antepartum testing at 32 weeks gestation (weekly NST+AFV or BPP); twice weekly testing is recommended if blood sugars are poorly controlled.   -Antepartum testing should be started at 40 weeks for women who do NOT require medications.  Check fasting blood sugar in hospital postpartum.   If normal, discontinue therapy and monitoring and recommend repeat postpartum glucose testing in 6-8 weeks postpartum using a 75 g oral glucose tolerance test.   If fasting blood glucose is between 100-125 mg/dl or impaired glucose tolerance is noted on 2 hour glucose test, refer to primary care as appropriate.   An ultrasound for estimated fetal weight should be obtained within 3 weeks of anticipated delivery; if the EFW is >= 4500 grams, a  should be offered.    Delivery timing:  Well controlled with diet: 39 0/7 - 40 6/7 weeks gestation  Oral agent or insulin required: 39 0/7 - 39 6/ 7 weeks gestation  Poorly controlled on oral agent or insulin: 38 0/7 - 38 6/7 weeks gestation

## 2024-06-27 ENCOUNTER — LAB VISIT (OUTPATIENT)
Dept: LAB | Facility: HOSPITAL | Age: 34
End: 2024-06-27
Attending: INTERNAL MEDICINE
Payer: COMMERCIAL

## 2024-06-27 ENCOUNTER — OFFICE VISIT (OUTPATIENT)
Dept: HEMATOLOGY/ONCOLOGY | Facility: CLINIC | Age: 34
End: 2024-06-27
Payer: COMMERCIAL

## 2024-06-27 VITALS
HEART RATE: 82 BPM | BODY MASS INDEX: 33.48 KG/M2 | WEIGHT: 213.31 LBS | RESPIRATION RATE: 18 BRPM | SYSTOLIC BLOOD PRESSURE: 117 MMHG | OXYGEN SATURATION: 97 % | DIASTOLIC BLOOD PRESSURE: 70 MMHG | HEIGHT: 67 IN

## 2024-06-27 DIAGNOSIS — O99.119 FACTOR V LEIDEN MUTATION AFFECTING PREGNANCY: ICD-10-CM

## 2024-06-27 DIAGNOSIS — O99.119 THROMBOCYTOPENIA AFFECTING PREGNANCY: Primary | ICD-10-CM

## 2024-06-27 DIAGNOSIS — D68.51 FACTOR V LEIDEN MUTATION AFFECTING PREGNANCY: ICD-10-CM

## 2024-06-27 DIAGNOSIS — D69.6 THROMBOCYTOPENIA AFFECTING PREGNANCY: ICD-10-CM

## 2024-06-27 DIAGNOSIS — D69.6 THROMBOCYTOPENIA AFFECTING PREGNANCY: Primary | ICD-10-CM

## 2024-06-27 DIAGNOSIS — O99.119 THROMBOCYTOPENIA AFFECTING PREGNANCY: ICD-10-CM

## 2024-06-27 LAB
ALBUMIN SERPL-MCNC: 3 G/DL (ref 3.5–5)
ALBUMIN/GLOB SERPL: 1 RATIO (ref 1.1–2)
ALP SERPL-CCNC: 98 UNIT/L (ref 40–150)
ALT SERPL-CCNC: 13 UNIT/L (ref 0–55)
ANION GAP SERPL CALC-SCNC: 10 MEQ/L
AST SERPL-CCNC: 13 UNIT/L (ref 5–34)
BASOPHILS # BLD AUTO: 0.01 X10(3)/MCL
BASOPHILS NFR BLD AUTO: 0.1 %
BILIRUB SERPL-MCNC: 0.4 MG/DL
BUN SERPL-MCNC: 10.3 MG/DL (ref 7–18.7)
CALCIUM SERPL-MCNC: 8.9 MG/DL (ref 8.4–10.2)
CHLORIDE SERPL-SCNC: 107 MMOL/L (ref 98–107)
CO2 SERPL-SCNC: 19 MMOL/L (ref 22–29)
CREAT SERPL-MCNC: 0.88 MG/DL (ref 0.55–1.02)
CREAT/UREA NIT SERPL: 12
EOSINOPHIL # BLD AUTO: 0.05 X10(3)/MCL (ref 0–0.9)
EOSINOPHIL NFR BLD AUTO: 0.5 %
ERYTHROCYTE [DISTWIDTH] IN BLOOD BY AUTOMATED COUNT: 15 % (ref 11.5–17)
GFR SERPLBLD CREATININE-BSD FMLA CKD-EPI: >60 ML/MIN/1.73/M2
GLOBULIN SER-MCNC: 2.9 GM/DL (ref 2.4–3.5)
GLUCOSE SERPL-MCNC: 107 MG/DL (ref 74–100)
HCT VFR BLD AUTO: 33.7 % (ref 37–47)
HGB BLD-MCNC: 11.1 G/DL (ref 12–16)
IMM GRANULOCYTES # BLD AUTO: 0.02 X10(3)/MCL (ref 0–0.04)
IMM GRANULOCYTES NFR BLD AUTO: 0.2 %
LYMPHOCYTES # BLD AUTO: 1.44 X10(3)/MCL (ref 0.6–4.6)
LYMPHOCYTES NFR BLD AUTO: 14.5 %
MCH RBC QN AUTO: 28.2 PG (ref 27–31)
MCHC RBC AUTO-ENTMCNC: 32.9 G/DL (ref 33–36)
MCV RBC AUTO: 85.5 FL (ref 80–94)
MONOCYTES # BLD AUTO: 0.52 X10(3)/MCL (ref 0.1–1.3)
MONOCYTES NFR BLD AUTO: 5.2 %
NEUTROPHILS # BLD AUTO: 7.87 X10(3)/MCL (ref 2.1–9.2)
NEUTROPHILS NFR BLD AUTO: 79.5 %
PLATELET # BLD AUTO: 90 X10(3)/MCL (ref 130–400)
PMV BLD AUTO: 11.4 FL (ref 7.4–10.4)
POTASSIUM SERPL-SCNC: 3.7 MMOL/L (ref 3.5–5.1)
PROT SERPL-MCNC: 5.9 GM/DL (ref 6.4–8.3)
RBC # BLD AUTO: 3.94 X10(6)/MCL (ref 4.2–5.4)
SODIUM SERPL-SCNC: 136 MMOL/L (ref 136–145)
WBC # BLD AUTO: 9.91 X10(3)/MCL (ref 4.5–11.5)

## 2024-06-27 PROCEDURE — 1160F RVW MEDS BY RX/DR IN RCRD: CPT | Mod: CPTII,S$GLB,, | Performed by: NURSE PRACTITIONER

## 2024-06-27 PROCEDURE — 99999 PR PBB SHADOW E&M-EST. PATIENT-LVL IV: CPT | Mod: PBBFAC,,, | Performed by: NURSE PRACTITIONER

## 2024-06-27 PROCEDURE — 3074F SYST BP LT 130 MM HG: CPT | Mod: CPTII,S$GLB,, | Performed by: NURSE PRACTITIONER

## 2024-06-27 PROCEDURE — 99213 OFFICE O/P EST LOW 20 MIN: CPT | Mod: S$GLB,,, | Performed by: NURSE PRACTITIONER

## 2024-06-27 PROCEDURE — 85025 COMPLETE CBC W/AUTO DIFF WBC: CPT

## 2024-06-27 PROCEDURE — 80053 COMPREHEN METABOLIC PANEL: CPT

## 2024-06-27 PROCEDURE — 36415 COLL VENOUS BLD VENIPUNCTURE: CPT

## 2024-06-27 PROCEDURE — 3008F BODY MASS INDEX DOCD: CPT | Mod: CPTII,S$GLB,, | Performed by: NURSE PRACTITIONER

## 2024-06-27 PROCEDURE — 1159F MED LIST DOCD IN RCRD: CPT | Mod: CPTII,S$GLB,, | Performed by: NURSE PRACTITIONER

## 2024-06-27 PROCEDURE — 3078F DIAST BP <80 MM HG: CPT | Mod: CPTII,S$GLB,, | Performed by: NURSE PRACTITIONER

## 2024-06-27 NOTE — PROGRESS NOTES
Subjective:       Patient ID: Daryl Musa is a 33 y.o. female.    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  Factor V Leiden heterozygous mutation  History of bilateral pulmonary emboli at age 17 provoked by oral contraceptive pills.    Current Treatment:   Prophylactic Lovenox.     Treatment History:  Lovenox with bridge to warfarin, continued warfarin for 9 months in 2008.    HPI:  33-year-old female who was started on oral contraceptive pills in 2008 at the age of 17, she then developed bilateral pulmonary emboli.  At that time, she was checked for DVT, this was negative.  She underwent hypercoagulable workup that showed that she was heterozygous for factor 5 Leiden.  She was started on Lovenox and bridged to warfarin.  She then treated with warfarin for 9 months.  After that, she was done with therapy for a provoked blood clot.  In October of 2020, the patient had a CBC that was normal including a normal platelet count.  She was then diagnosed with COVID-19 on multiple occasions, including 08/17/2022.  On 11/17/2022, she had a CBC that revealed a platelet count of 78,000. Repeat on 11/29/2022 showed increase in her platelet count, however she still had thrombocytopenia with a platelet count of 108,000. The patient was on baby aspirin at that time, stopped taking baby aspirin and had a repeat CBC on 01/04/2023 that showed normalization of her platelet count.    Patient subsequently became pregnant, to her factor 5 Leiden heterozygous state, she was started on prophylactic Lovenox.  She  underwent blood work on 01/24/2024 that revealed a platelet count down to 84,000.  The rest of the CBC was within normal limits.  Due to her likely ITP along with a factor 5 Leiden heterozygous state, she was referred to Hematology.  I saw her on 02/07/2024.  At that visit, she stated that overall she was doing well and she had no major issues to discuss.  She denied any  bleeding or bruising.    Interval History:   Patient presents to clinic for scheduled follow up appointment.  Overall she was doing well, She is currently 32 weeks pregnant. She denies any abnormal bleeding. She continues lovenox 40 mg daily.      Past Medical History:   Diagnosis Date    Anxiety     Dyspareunia     Endometriosis, unspecified     Factor 5 Leiden mutation, heterozygous     Fibroid     Infertility, female     Migraine     Obesity, unspecified     Other pulmonary embolism without acute cor pulmonale       Past Surgical History:   Procedure Laterality Date    bunionectomy left foot Left     CERVICAL BIOPSY  W/ LOOP ELECTRODE EXCISION      LAPAROSCOPY      Dx:Emosis    LYSIS OF ADHESIONS  10/16/2019    MOUTH SURGERY      WISDOM TOOTH EXTRACTION       Social History     Socioeconomic History    Marital status:      Spouse name: melissa    Number of children: 0   Occupational History    Occupation: [employer] Tech   Tobacco Use    Smoking status: Former     Current packs/day: 0.00     Average packs/day: 0.3 packs/day for 10.0 years (2.5 ttl pk-yrs)     Types: Cigarettes     Start date:      Quit date:      Years since quittin.4    Smokeless tobacco: Never    Tobacco comments:     Quit 1-1/2 years ago.   Substance and Sexual Activity    Alcohol use: Not Currently     Comment: 1-2 times a month    Drug use: Never    Sexual activity: Yes     Partners: Male     Birth control/protection: None     Social Determinants of Health     Financial Resource Strain: Low Risk  (2023)    Overall Financial Resource Strain (CARDIA)     Difficulty of Paying Living Expenses: Not hard at all   Food Insecurity: No Food Insecurity (2023)    Hunger Vital Sign     Worried About Running Out of Food in the Last Year: Never true     Ran Out of Food in the Last Year: Never true   Transportation Needs: No Transportation Needs (2023)    PRAPARE - Transportation     Lack of Transportation (Medical): No      Lack of Transportation (Non-Medical): No   Physical Activity: Insufficiently Active (12/11/2023)    Exercise Vital Sign     Days of Exercise per Week: 3 days     Minutes of Exercise per Session: 40 min   Stress: No Stress Concern Present (12/11/2023)    [employer] - Occupational Stress Questionnaire     Feeling of Stress : Not at all   Housing Stability: Low Risk  (12/11/2023)    Housing Stability Vital Sign     Unable to Pay for Housing in the Last Year: No     Number of Places Lived in the Last Year: 1     Unstable Housing in the Last Year: No      Family History   Problem Relation Name Age of Onset    Cancer Mother Sejal Woods     Breast cancer Mother Sejal Woods     Migraines Mother Sejal Woods     No Known Problems Sister        Review of patient's allergies indicates:   Allergen Reactions    Hydrocodone-acetaminophen Hives, Itching and Nausea And Vomiting    Adhesive Other (See Comments)     sensitivity to bandaids and surgical drapes    Hydrocodone Rash and Nausea And Vomiting      Review of Systems   Constitutional:  Negative for appetite change and unexpected weight change.   HENT:  Negative for mouth sores.    Eyes:  Negative for visual disturbance.   Respiratory:  Negative for cough and shortness of breath.    Cardiovascular:  Negative for chest pain.   Gastrointestinal:  Negative for abdominal pain and diarrhea.   Genitourinary:  Negative for frequency.   Musculoskeletal:  Negative for back pain.   Integumentary:  Negative for rash.   Neurological:  Negative for headaches.   Hematological:  Negative for adenopathy.   Psychiatric/Behavioral:  The patient is not nervous/anxious.          Objective:      Physical Exam  Vitals reviewed.   Constitutional:       General: She is not in acute distress.     Appearance: Normal appearance.   HENT:      Head: Normocephalic and atraumatic.      Nose: Nose normal.      Mouth/Throat:      Mouth: Mucous membranes are moist.    Eyes:      Extraocular Movements: Extraocular movements intact.      Conjunctiva/sclera: Conjunctivae normal.   Cardiovascular:      Rate and Rhythm: Normal rate and regular rhythm.      Pulses: Normal pulses.      Heart sounds: Normal heart sounds.   Pulmonary:      Effort: Pulmonary effort is normal.      Breath sounds: Normal breath sounds.   Musculoskeletal:         General: No swelling. Normal range of motion.      Cervical back: Normal range of motion and neck supple.      Right lower leg: No edema.      Left lower leg: No edema.   Skin:     General: Skin is warm and dry.   Neurological:      General: No focal deficit present.      Mental Status: She is alert and oriented to person, place, and time. Mental status is at baseline.   Psychiatric:         Mood and Affect: Mood normal.         Behavior: Behavior normal.         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  Factor V Leiden heterozygous mutation  History of bilateral pulmonary emboli at age 17 provoked by oral contraceptive pills.        Plan:       At this time, the patient has a factor 5 Leiden heterozygous mutation and likely ITP.    She is 32 weeks pregnant as of today.    I agree with prophylactic Lovenox at current dose.    Her CBC today, 6/27/2024 revealed a platelet count of 90,000.    Plan to see her every 4 weeks until she was about 36 weeks' gestation.  We will then plan to see her once every 2 weeks.  If she ever needed treatment to get her platelet count above 80,000 so that she could receive an epidural, we would discuss treating with high-dose steroids and IVIG.    Return to clinic in 4 weeks with same day labs    Melvi Trimble NP   Oncology/Hematology  [facility]

## 2024-07-08 DIAGNOSIS — O99.119 FACTOR V LEIDEN MUTATION AFFECTING PREGNANCY: ICD-10-CM

## 2024-07-08 DIAGNOSIS — D68.51 FACTOR V LEIDEN MUTATION AFFECTING PREGNANCY: ICD-10-CM

## 2024-07-08 DIAGNOSIS — O24.415 GESTATIONAL DIABETES MELLITUS (GDM) IN THIRD TRIMESTER CONTROLLED ON ORAL HYPOGLYCEMIC DRUG: ICD-10-CM

## 2024-07-08 DIAGNOSIS — D69.6 THROMBOCYTOPENIA AFFECTING PREGNANCY: ICD-10-CM

## 2024-07-08 DIAGNOSIS — O99.119 THROMBOCYTOPENIA AFFECTING PREGNANCY: ICD-10-CM

## 2024-07-08 DIAGNOSIS — O99.343 MENTAL DISORDER AFFECTING PREGNANCY IN THIRD TRIMESTER: Primary | ICD-10-CM

## 2024-07-08 DIAGNOSIS — O35.EXX0 PYELECTASIS OF FETUS ON PRENATAL ULTRASOUND: ICD-10-CM

## 2024-07-10 ENCOUNTER — PROCEDURE VISIT (OUTPATIENT)
Dept: MATERNAL FETAL MEDICINE | Facility: CLINIC | Age: 34
End: 2024-07-10
Payer: COMMERCIAL

## 2024-07-10 ENCOUNTER — OFFICE VISIT (OUTPATIENT)
Dept: MATERNAL FETAL MEDICINE | Facility: CLINIC | Age: 34
End: 2024-07-10
Payer: COMMERCIAL

## 2024-07-10 VITALS
SYSTOLIC BLOOD PRESSURE: 123 MMHG | HEIGHT: 67 IN | HEART RATE: 87 BPM | DIASTOLIC BLOOD PRESSURE: 79 MMHG | BODY MASS INDEX: 32.94 KG/M2 | WEIGHT: 209.88 LBS

## 2024-07-10 DIAGNOSIS — O24.415 GESTATIONAL DIABETES MELLITUS (GDM) IN THIRD TRIMESTER CONTROLLED ON ORAL HYPOGLYCEMIC DRUG: ICD-10-CM

## 2024-07-10 DIAGNOSIS — D68.51 FACTOR V LEIDEN MUTATION AFFECTING PREGNANCY: ICD-10-CM

## 2024-07-10 DIAGNOSIS — O99.343 MENTAL DISORDER AFFECTING PREGNANCY IN THIRD TRIMESTER: ICD-10-CM

## 2024-07-10 DIAGNOSIS — O35.EXX0 PYELECTASIS OF FETUS ON PRENATAL ULTRASOUND: ICD-10-CM

## 2024-07-10 DIAGNOSIS — D69.6 THROMBOCYTOPENIA AFFECTING PREGNANCY: ICD-10-CM

## 2024-07-10 DIAGNOSIS — Z98.890 H/O LEEP (LOOP ELECTROSURGICAL EXCISION PROCEDURE) OF CERVIX COMPLICATING PREGNANCY, THIRD TRIMESTER: ICD-10-CM

## 2024-07-10 DIAGNOSIS — O99.119 THROMBOCYTOPENIA AFFECTING PREGNANCY: ICD-10-CM

## 2024-07-10 DIAGNOSIS — O34.43 H/O LEEP (LOOP ELECTROSURGICAL EXCISION PROCEDURE) OF CERVIX COMPLICATING PREGNANCY, THIRD TRIMESTER: ICD-10-CM

## 2024-07-10 DIAGNOSIS — O09.513 PRIMIGRAVIDA OF ADVANCED MATERNAL AGE IN THIRD TRIMESTER: ICD-10-CM

## 2024-07-10 DIAGNOSIS — O99.119 FACTOR V LEIDEN MUTATION AFFECTING PREGNANCY: Primary | ICD-10-CM

## 2024-07-10 DIAGNOSIS — D68.51 FACTOR V LEIDEN MUTATION AFFECTING PREGNANCY: Primary | ICD-10-CM

## 2024-07-10 DIAGNOSIS — O99.119 FACTOR V LEIDEN MUTATION AFFECTING PREGNANCY: ICD-10-CM

## 2024-07-10 RX ORDER — INSULIN GLARGINE 100 [IU]/ML
10 INJECTION, SOLUTION SUBCUTANEOUS NIGHTLY
Qty: 12 ML | Refills: 3 | Status: SHIPPED | OUTPATIENT
Start: 2024-07-10

## 2024-07-10 NOTE — ASSESSMENT & PLAN NOTE
24- Mild bilateral renal pelvic dilation measuring 6.4mm on the left and 5.5mm on the right was noted consistent with UTDA1. The kidneys appear normal as does the bladder. She has a low risk cfDNA and this is a female fetus   The finding of mild renal pelvis dilation was discussed with the patient. We reviewed basic anatomy of the renal collecting system and then discussed possible etiologies for renal pelvis dilation. When dilation of the renal pelvis is borderline or mild, most cases will resolve spontaneously. However, if the renal dilation persists in the  period, the most common conditions that can cause renal pelvis dilation are UPJ obstruction (ureteropelvic junction), UVJ obstruction (ureterovesical junction) and VUR (vesicoureteral reflux). These conditions predispose infants/children to urinary tract infection, but can be effectively followed and treated. I explained that the vast majority of infants with these findings will have spontaneous resolution during pregnancy. We will plan to re-evaluate the kidneys at her next office visit.   24- Left 6.8mm, Right 5.4mm. No evidence of megaureter. Normal appearing kidneys and bladder. Will continue to monitor.   24- Left 10.6mm, right 5.3mm. No evidence of megaureter. Normal appearing kidneys and bladder. With increasing dilation in left renal pelvis, spontaneous resolution in third trimester is less likely. Recommend  evaluation.  7/10/24- Normal appearance bilaterally today. We will look again at next visit. Consider  imaging if she delivers before.

## 2024-07-10 NOTE — ASSESSMENT & PLAN NOTE
LEEP of the cervix has been associated with premature labor and  PROM. Most such deliveries are late  births (34-37 weeks), and many studies show no significant difference in the frequency of infants with birth weight <2500g.  Adverse outcomes may be associated with the  time interval from procedure to conception.    Cervical length measurement will be performed at the time of anatomy scan. Prophylactic cerclage placement solely for history of LEEP, has not been shown to provide benefit, and it is not recommended.    3/20/24- TVU CL >30mm.     Recommendations:  Baseline transvaginal ultrasound cervical length (TVU CL) at anatomy scan (19-20 weeks' gestation- scheduled at [facility])  If TVU CL ?30 mm, then routine prenatal care.

## 2024-07-10 NOTE — ASSESSMENT & PLAN NOTE
Previously counseled the patient regarding the risks of gestational diabetes, which include macrosomia, hypertensive disorders of pregnancy,  hypoglycemia, shoulder dystocia/birth trauma, polyhydramnios, and increased risk of  delivery.  Patients requiring medical therapy have an increased risk of stillbirth.  Discussed that  outcome is linked to her ability to achieve glycemic control.  The goal of treatment is to have a fasting blood sugar between 70 and 95 mg/dL and 2 hour postprandials less than 120 mg/dL.      She is currently taking Metformin 1500mg QHS. She presents with a log today which demonstrates persistently elevated fasting values. Metformin is not tolerated well at this dose and we discussed reducing to 1000mg qHS.    Recommendations:  MFM will review blood sugars in 1 week via portal; We reinforced checking 4 x/day and reinforced goal blood sugars  Regimen: Metformin 1000mg + Lantus 10u QHS  If medications are required, recommend growth scans every 4-6 weeks, starting at 28 weeks gestation  If medications are required, recommend starting antepartum testing at 32 weeks gestation (weekly NST+AFV or BPP); twice weekly testing is recommended if blood sugars are poorly controlled.   -Antepartum testing should be started at 40 weeks for women who do NOT require medications.  Check fasting blood sugar in hospital postpartum.   If normal, discontinue therapy and monitoring and recommend repeat postpartum glucose testing in 6-8 weeks postpartum using a 75 g oral glucose tolerance test.   If fasting blood glucose is between 100-125 mg/dl or impaired glucose tolerance is noted on 2 hour glucose test, refer to primary care as appropriate.   An ultrasound for estimated fetal weight should be obtained within 3 weeks of anticipated delivery; if the EFW is >= 4500 grams, a  should be offered.    Delivery timing:  Well controlled with diet: 39 0/7 - 40 6/7 weeks gestation  Oral agent  or insulin required: 39 0/7 - 39 6/ 7 weeks gestation-- IOL at 39 weeks recommended and discussed today.  Poorly controlled on oral agent or insulin: 38 0/7 - 38 6/7 weeks gestation

## 2024-07-10 NOTE — ASSESSMENT & PLAN NOTE
Thrombocytopenia is a complex process leading to isolated thrombocytopenia (platelet count < 100 K/?L in absence of other etiologies. ITP can be primary (acquired immune-mediated disorder) or secondary (associated with underlying disease or drug exposure). The patient was counseled regarding the risks of ITP in pregnancy which include but are not limited to: mild to moderate bleeding (cutaneous or mucosal bleeding/both), risk of anesthetic complications (epidural hematoma), maternal hemorrhage (risk is higher in women with severe ITP-platelet count < 50 K/?L), fetal thrombocytopenia, and  thrombocytopenia (25% risk). Maternal platelet count does not predict the risk of  thrombocytopenia at birth. Severe fetal thrombocytopenia resulting in severe hemorrhage complications is rare (less than 1%). The  platelet count usually nadirs within the first 2 weeks of life. Assessing for antiplatelet antibodies is not indicated as the presence or absence of these is not useful in clinical management.    24 (@ Julia's office) - PLT 85K  3/6/24 (through Dr Dumont) - PLT 77K  24 ([place])- PLT 69K  24 (heme/onc)- PLT 73K  24 - 28w (heme/onc)- PLT 79K  24- 32w (heme/onc)- PLT 90K    Recommendations:  Check CBC every 4 weeks; increase frequency of evaluation at 32 weeks to every 2 weeks, and weekly at 36 weeks and beyond (will obtain through Hematology - Dr Dumont)  Continue care with Hematology/Oncology  If platelet count is > 50 K/?Land patient is asymptomatic, no treatment is indicated  Treatment is indicated:  For patients with a platelet count is < 30 K/?L   For patients who have symptomatic bleeding  For patients with a platelet count < 50 K/?L who are anticipated to undergo a  delivery  For patients with platelet count < 70 - 80 K/?L around the time of delivery to increase the likelihood of being able to safely receive neuraxial anesthesia   Anesthesia consultation is  recommended around 30-32 weeks gestation  Platelet transfusion should be administered:  For patients with a platelet count < 30 K/?L and undergoing vaginal delivery  For patients experiencing significant bleeding  For patients with a platelet count < 50 K/?L and undergoing  delivery  Treatment options:  First line: prednisone 0.5 - 2 mg/kg/day (response is usually seen within 4-14 days)/alternative is dexamethasone burst (40 mg POD daily x 4 days)  Stress dosed steroids are recommended at time of delivery in patients receiving prednisone ? 10 mg daily (or steroid equivalent) for ? 21 days   IVIG can be considered for refractory cases or when rapid increase is necessary in consultation with Hematology/Oncology  Splenectomy can be considered in extreme circumstances, if recommended by Hematology/Oncology (this is best accomplished in second trimester) - N/A  Ensure appropriate vaccinations (for pneumococcus, HIB, and meningococcus)  Avoid NSAIDs and aspirin (she has stopped ASA)  If candidate for anticoagulation (ie prophylactic lovenox), discuss with Hematology/Oncology - Dr Dumont aware of Ppx Lovenox for Factor V/hx VTE  Check CBC on admission to L&D; type and crossmatch for prbc and transfuse platelets, if appropriate, as above  Mode of delivery per obstetric indications; however, would avoid use of fetal scalp electrode and avoid operative vaginal delivery (particularly vacuum assisted)  Notify pediatrics of maternal condition after birth as  will need platelet count checked and circumcision of male infant may need to be deferred

## 2024-07-10 NOTE — PROGRESS NOTES
"Maternal Fetal Medicine follow up consult    SUBJECTIVE:     Daryl Musa is a 34 y.o.  female with IUP at 34w1d who is seen in follow up consultation by M.  Pregnancy complications include:   Problem   - Gestational diabetes mellitus (GDM) in third trimester controlled on oral hypoglycemic drug   - Pyelectasis of fetus on prenatal ultrasound   - Thrombocytopenia affecting pregnancy   - H/O LEEP (loop electrosurgical excision procedure) of cervix complicating pregnancy, third trimester   - Primigravida of advanced maternal age in third trimester   - Factor V Leiden mutation affecting pregnancy   - Anxiety/depression affecting pregnancy in third trimester     Daryl presents for routine follow up appointment.  She has been submitting her log routinely for review. She's currently taking Metformin 1500mg QHS. She presents with a log for review. She still has BG out of range and the metformin is hard on her stomach.   She continues to follow closely with heme/onc. Last PLT 90K on 24.  Denies LOF, VB, contractions. Positive fetal movement.    Previous notes reviewed.   No changes to medical, surgical, family, social, or obstetric history.  Interval history since last MFM visit: see above  Medications reviewed.    Care team members:  Dr Marshall - Primary OB     OBJECTIVE:   /79 (BP Location: Right arm)   Pulse 87   Ht 5' 7" (1.702 m)   Wt 95.2 kg (209 lb 14.1 oz)   LMP 2023 (Exact Date)   BMI 32.87 kg/m²     Ultrasound performed. See viewpoint for full ultrasound report.    A viable dia pregnancy is visualized in cephalic presentation.  Estimated fetal weight is at the 43rd percentile with an abdominal circumference at the 65th percentile.    No fetal abnormalities are noted and previous hydronephrosis is resolved. Amniotic fluid volume is normal.  Placenta is anterior. Biophysical profile is 8/8.             ASSESSMENT/PLAN:     34 y.o.  female with IUP at 34w1d    - Factor " V Leiden mutation affecting pregnancy  Results from an inherited thrombophilia evaluation indicate that she is heterozygous for the Factor V Leiden mutation. Approximately 3-8% of the general population is heterozygous for the R506Q mutation in the Factor V gene. Factor V Leiden is characterized by poor anticoagulant response to activated protein C and an increased risk for venous thrombosis. Factor V Leiden is inactivated at a rate approximately 10 times slower than normal Factor V and persists longer in circulation, resulting in an increased thrombin generation and a mild hypercoagulable state.    We discussed the meaning of genes and mutations in depth. She is aware that one of her Factor V genes does not work the way that it should, which causes an increased risk for blood clots. We have discussed inheritance patterns with Factor V Leiden. Since she is heterozygous, there is a 50% chance that each of her children could inherit this change, and subsequently be at risk to develop thromboses when they are adults. Factor V Leiden testing is not routinely offerred to children because this is not a disorder that would change their medical management during childhood, as the risks of treatment are greater than the risk of thrombosis.     She is understandably concerned about her health, as well as the health of this pregnancy. We discussed that during pregnancy there is a 7-16 fold increase in thrombotic risk in Factor V Leiden heterozygotes. Factor V Leiden heterozygotes and homozygotes have also been found to be at an increased risk for pregnancy loss, particularly during the 2nd and 3rd trimesters. There is also a controversial association between Factor V Leiden and pre-eclampsia, growth restriction, and placental abruption due to poor placental perfusion.  Although these risks are increased significantly over the general population, is should be noted that the risk for pregnancy related venous thrombosis in  asymptomatic FVL heterozygotes is approximately 0.2%. In individuals homozygous for FVL, the risk for venous thrombosis is approximately 15%. It is important for patients who have Factor V Leiden to be tested for other inherited or acquired thrombophilias such as prothrombin, methylenetetrahydrofolate reductase (MTHFR), anti-cardiolipin, phospholipid dependent coagulation assays for lupus inhibitor, protein C activity, and protein S activity. If abnormal values or mutations were to be found in any of the above listed tests, the risk for thrombosis will increase.      Recommendations:  Prophylactic anticoagulation during the antepartum period/ prophylactic anticoagulation for 6 weeks postpartum  For patients with a history of PE, consider maternal echocardiogram to assess for right heart strain    We recommend use of LMWH/enoxaparin over unfractionated heparin If the patient is unable to fill this medication, or if there is a high likelihood for need to reverse this medication, unfractionated heparin should be prescribed. She will continue anticoagulation until 6 weeks postpartum.  Prophylaxis:  For patients with a BMI =< 40, prescribe lovenox 40 mg daily    Peripartum Management  -YARA hose/SCDs should be used while anticoagulation is interrupted.  -Regardless of whether the patient is on prophylactic dose UFH or LMWH, the last dose should be 12 hours prior to neuraxial anesthesia. For this reason, we no longer recommend conversion to UFH at 36 weeks.   -In patients with neuraxial anesthesia: prophylactic anticoagulation should not be initiated within 12 hours of neuraxial blockade or within 4 hours of epidural removal, whichever is later.    Postpartum Management  -Prophylactic anticoagulation (defer to timing related to neuraxial anesthesia)  After vaginal delivery: should be started no sooner than 6 hours  After  delivery: should be started no sooner than 12 hours  -Breastfeeding is compatible with  warfarin, UFH, and LMWH.      - H/O LEEP (loop electrosurgical excision procedure) of cervix complicating pregnancy, third trimester  LEEP of the cervix has been associated with premature labor and  PROM. Most such deliveries are late  births (34-37 weeks), and many studies show no significant difference in the frequency of infants with birth weight <2500g.  Adverse outcomes may be associated with the  time interval from procedure to conception.    Cervical length measurement will be performed at the time of anatomy scan. Prophylactic cerclage placement solely for history of LEEP, has not been shown to provide benefit, and it is not recommended.    3/20/24- TVU CL >30mm.     Recommendations:  Baseline transvaginal ultrasound cervical length (TVU CL) at anatomy scan (19-20 weeks' gestation- scheduled at [facility])  If TVU CL ?30 mm, then routine prenatal care.      - Primigravida of advanced maternal age in third trimester  Previously counseled the patient on the relationship between maternal age and fetal aneuploidy as well as the association between advanced maternal age (AMA) and preeclampsia, gestational diabetes, and fetal growth restriction.    NIPT low risk (46,XX)    Recommendations:  Detailed anatomic survey at 19-20 weeks    Follow-up fetal ultrasound at 32-34 weeks for interval fetal growth  Has discontinued d/t thrombocytopenia        - Pyelectasis of fetus on prenatal ultrasound  24- Mild bilateral renal pelvic dilation measuring 6.4mm on the left and 5.5mm on the right was noted consistent with UTDA1. The kidneys appear normal as does the bladder. She has a low risk cfDNA and this is a female fetus   The finding of mild renal pelvis dilation was discussed with the patient. We reviewed basic anatomy of the renal collecting system and then discussed possible etiologies for renal pelvis dilation. When dilation of the renal pelvis is borderline or mild, most cases will resolve spontaneously.  However, if the renal dilation persists in the  period, the most common conditions that can cause renal pelvis dilation are UPJ obstruction (ureteropelvic junction), UVJ obstruction (ureterovesical junction) and VUR (vesicoureteral reflux). These conditions predispose infants/children to urinary tract infection, but can be effectively followed and treated. I explained that the vast majority of infants with these findings will have spontaneous resolution during pregnancy. We will plan to re-evaluate the kidneys at her next office visit.   24- Left 6.8mm, Right 5.4mm. No evidence of megaureter. Normal appearing kidneys and bladder. Will continue to monitor.   24- Left 10.6mm, right 5.3mm. No evidence of megaureter. Normal appearing kidneys and bladder. With increasing dilation in left renal pelvis, spontaneous resolution in third trimester is less likely. Recommend  evaluation.  7/10/24- Normal appearance bilaterally today. We will look again at next visit. Consider  imaging if she delivers before.    - Thrombocytopenia affecting pregnancy  Thrombocytopenia is a complex process leading to isolated thrombocytopenia (platelet count < 100 K/?L in absence of other etiologies. ITP can be primary (acquired immune-mediated disorder) or secondary (associated with underlying disease or drug exposure). The patient was counseled regarding the risks of ITP in pregnancy which include but are not limited to: mild to moderate bleeding (cutaneous or mucosal bleeding/both), risk of anesthetic complications (epidural hematoma), maternal hemorrhage (risk is higher in women with severe ITP-platelet count < 50 K/?L), fetal thrombocytopenia, and  thrombocytopenia (25% risk). Maternal platelet count does not predict the risk of  thrombocytopenia at birth. Severe fetal thrombocytopenia resulting in severe hemorrhage complications is rare (less than 1%). The  platelet count usually  nadirs within the first 2 weeks of life. Assessing for antiplatelet antibodies is not indicated as the presence or absence of these is not useful in clinical management.    24 (@ Julia's office) - PLT 85K  3/6/24 (through Dr Dumont) - PLT 77K  24 ([facility])- PLT 69K  24 (heme/onc)- PLT 73K  24 - 28w (heme/onc)- PLT 79K  24- 32w (heme/onc)- PLT 90K    Recommendations:  Check CBC every 4 weeks; increase frequency of evaluation at 32 weeks to every 2 weeks, and weekly at 36 weeks and beyond (will obtain through Hematology - Dr Dumont)  Continue care with Hematology/Oncology  If platelet count is > 50 K/?Land patient is asymptomatic, no treatment is indicated  Treatment is indicated:  For patients with a platelet count is < 30 K/?L   For patients who have symptomatic bleeding  For patients with a platelet count < 50 K/?L who are anticipated to undergo a  delivery  For patients with platelet count < 70 - 80 K/?L around the time of delivery to increase the likelihood of being able to safely receive neuraxial anesthesia   Anesthesia consultation is recommended around 30-32 weeks gestation  Platelet transfusion should be administered:  For patients with a platelet count < 30 K/?L and undergoing vaginal delivery  For patients experiencing significant bleeding  For patients with a platelet count < 50 K/?L and undergoing  delivery  Treatment options:  First line: prednisone 0.5 - 2 mg/kg/day (response is usually seen within 4-14 days)/alternative is dexamethasone burst (40 mg POD daily x 4 days)  Stress dosed steroids are recommended at time of delivery in patients receiving prednisone ? 10 mg daily (or steroid equivalent) for ? 21 days   IVIG can be considered for refractory cases or when rapid increase is necessary in consultation with Hematology/Oncology  Splenectomy can be considered in extreme circumstances, if recommended by Hematology/Oncology (this is best accomplished in second  trimester) - N/A  Ensure appropriate vaccinations (for pneumococcus, HIB, and meningococcus)  Avoid NSAIDs and aspirin (she has stopped ASA)  If candidate for anticoagulation (ie prophylactic lovenox), discuss with Hematology/Oncology - Dr Dumont aware of Ppx Lovenox for Factor V/hx VTE  Check CBC on admission to L&D; type and crossmatch for prbc and transfuse platelets, if appropriate, as above  Mode of delivery per obstetric indications; however, would avoid use of fetal scalp electrode and avoid operative vaginal delivery (particularly vacuum assisted)  Notify pediatrics of maternal condition after birth as  will need platelet count checked and circumcision of male infant may need to be deferred      - Gestational diabetes mellitus (GDM) in third trimester controlled on oral hypoglycemic drug  Previously counseled the patient regarding the risks of gestational diabetes, which include macrosomia, hypertensive disorders of pregnancy,  hypoglycemia, shoulder dystocia/birth trauma, polyhydramnios, and increased risk of  delivery.  Patients requiring medical therapy have an increased risk of stillbirth.  Discussed that  outcome is linked to her ability to achieve glycemic control.  The goal of treatment is to have a fasting blood sugar between 70 and 95 mg/dL and 2 hour postprandials less than 120 mg/dL.      She is currently taking Metformin 1500mg QHS. She presents with a log today which demonstrates persistently elevated fasting values. Metformin is not tolerated well at this dose and we discussed reducing to 1000mg qHS.    Recommendations:  MFM will review blood sugars in 1 week via portal; We reinforced checking 4 x/day and reinforced goal blood sugars  Regimen: Metformin 1000mg + Lantus 10u QHS  If medications are required, recommend growth scans every 4-6 weeks, starting at 28 weeks gestation  If medications are required, recommend starting antepartum testing at 32 weeks  gestation (weekly NST+AFV or BPP); twice weekly testing is recommended if blood sugars are poorly controlled.   -Antepartum testing should be started at 40 weeks for women who do NOT require medications.  Check fasting blood sugar in hospital postpartum.   If normal, discontinue therapy and monitoring and recommend repeat postpartum glucose testing in 6-8 weeks postpartum using a 75 g oral glucose tolerance test.   If fasting blood glucose is between 100-125 mg/dl or impaired glucose tolerance is noted on 2 hour glucose test, refer to primary care as appropriate.   An ultrasound for estimated fetal weight should be obtained within 3 weeks of anticipated delivery; if the EFW is >= 4500 grams, a  should be offered.    Delivery timing:  Well controlled with diet: 39 0/7 - 40 6/7 weeks gestation  Oral agent or insulin required: 39 0/7 - 39 6/ 7 weeks gestation-- IOL at 39 weeks recommended and discussed today.  Poorly controlled on oral agent or insulin: 38 0/7 - 38 6/7 weeks gestation      - Anxiety/depression affecting pregnancy in third trimester  She reports a history of anxiety and depression. She is taking Zoloft 50mg daily. She reports improvement of symptoms with the utilization of this medication regimen. Discussed increased risk for peripartum and postpartum mood disorders. Precautions provided.      We discussed the usage of SSRIs in pregnancy and the minimal risks associated with the fetus.      We have discussed the importance of optimization of mental health conditions during pregnancy in an effort to reduce the risk of postpartum mood disorders. We have discussed options for management including psychotherapy, counseling, cognitive behavioral therapy, exercise/yoga, journaling, and psychiatry services. She will notify our office if she is interested in being referred for any of the above.      F/u in 4 weeks for MFM visit /growth ultrasound    Isabella Christensen MD  Maternal Fetal Medicine

## 2024-07-23 LAB — PRENATAL STREP B CULTURE: NORMAL

## 2024-07-25 ENCOUNTER — LAB VISIT (OUTPATIENT)
Dept: LAB | Facility: HOSPITAL | Age: 34
End: 2024-07-25
Attending: INTERNAL MEDICINE
Payer: COMMERCIAL

## 2024-07-25 ENCOUNTER — OFFICE VISIT (OUTPATIENT)
Dept: HEMATOLOGY/ONCOLOGY | Facility: CLINIC | Age: 34
End: 2024-07-25
Payer: COMMERCIAL

## 2024-07-25 VITALS
WEIGHT: 208 LBS | RESPIRATION RATE: 16 BRPM | DIASTOLIC BLOOD PRESSURE: 75 MMHG | HEIGHT: 67 IN | HEART RATE: 79 BPM | OXYGEN SATURATION: 96 % | BODY MASS INDEX: 32.65 KG/M2 | SYSTOLIC BLOOD PRESSURE: 121 MMHG

## 2024-07-25 DIAGNOSIS — D69.6 THROMBOCYTOPENIA AFFECTING PREGNANCY: ICD-10-CM

## 2024-07-25 DIAGNOSIS — O99.119 FACTOR V LEIDEN MUTATION AFFECTING PREGNANCY: ICD-10-CM

## 2024-07-25 DIAGNOSIS — D68.51 FACTOR V LEIDEN MUTATION AFFECTING PREGNANCY: Primary | ICD-10-CM

## 2024-07-25 DIAGNOSIS — D68.51 FACTOR V LEIDEN MUTATION AFFECTING PREGNANCY: ICD-10-CM

## 2024-07-25 DIAGNOSIS — O99.119 FACTOR V LEIDEN MUTATION AFFECTING PREGNANCY: Primary | ICD-10-CM

## 2024-07-25 DIAGNOSIS — O99.119 THROMBOCYTOPENIA AFFECTING PREGNANCY: ICD-10-CM

## 2024-07-25 LAB
ALBUMIN SERPL-MCNC: 2.7 G/DL (ref 3.5–5)
ALBUMIN/GLOB SERPL: 0.9 RATIO (ref 1.1–2)
ALP SERPL-CCNC: 129 UNIT/L (ref 40–150)
ALT SERPL-CCNC: 15 UNIT/L (ref 0–55)
ANION GAP SERPL CALC-SCNC: 9 MEQ/L
AST SERPL-CCNC: 14 UNIT/L (ref 5–34)
BASOPHILS # BLD AUTO: 0.02 X10(3)/MCL
BASOPHILS NFR BLD AUTO: 0.2 %
BILIRUB SERPL-MCNC: 0.4 MG/DL
BUN SERPL-MCNC: 9.8 MG/DL (ref 7–18.7)
CALCIUM SERPL-MCNC: 8.8 MG/DL (ref 8.4–10.2)
CHLORIDE SERPL-SCNC: 106 MMOL/L (ref 98–107)
CO2 SERPL-SCNC: 20 MMOL/L (ref 22–29)
CREAT SERPL-MCNC: 0.76 MG/DL (ref 0.55–1.02)
CREAT/UREA NIT SERPL: 13
EOSINOPHIL # BLD AUTO: 0.05 X10(3)/MCL (ref 0–0.9)
EOSINOPHIL NFR BLD AUTO: 0.6 %
ERYTHROCYTE [DISTWIDTH] IN BLOOD BY AUTOMATED COUNT: 14.9 % (ref 11.5–17)
GFR SERPLBLD CREATININE-BSD FMLA CKD-EPI: >60 ML/MIN/1.73/M2
GLOBULIN SER-MCNC: 2.9 GM/DL (ref 2.4–3.5)
GLUCOSE SERPL-MCNC: 112 MG/DL (ref 74–100)
HCT VFR BLD AUTO: 34.5 % (ref 37–47)
HGB BLD-MCNC: 11.2 G/DL (ref 12–16)
IMM GRANULOCYTES # BLD AUTO: 0.02 X10(3)/MCL (ref 0–0.04)
IMM GRANULOCYTES NFR BLD AUTO: 0.2 %
LYMPHOCYTES # BLD AUTO: 1.35 X10(3)/MCL (ref 0.6–4.6)
LYMPHOCYTES NFR BLD AUTO: 15.2 %
MCH RBC QN AUTO: 27.6 PG (ref 27–31)
MCHC RBC AUTO-ENTMCNC: 32.5 G/DL (ref 33–36)
MCV RBC AUTO: 85 FL (ref 80–94)
MONOCYTES # BLD AUTO: 0.56 X10(3)/MCL (ref 0.1–1.3)
MONOCYTES NFR BLD AUTO: 6.3 %
NEUTROPHILS # BLD AUTO: 6.9 X10(3)/MCL (ref 2.1–9.2)
NEUTROPHILS NFR BLD AUTO: 77.5 %
PLATELET # BLD AUTO: 81 X10(3)/MCL (ref 130–400)
PMV BLD AUTO: 12 FL (ref 7.4–10.4)
POTASSIUM SERPL-SCNC: 3.7 MMOL/L (ref 3.5–5.1)
PROT SERPL-MCNC: 5.6 GM/DL (ref 6.4–8.3)
RBC # BLD AUTO: 4.06 X10(6)/MCL (ref 4.2–5.4)
SODIUM SERPL-SCNC: 135 MMOL/L (ref 136–145)
WBC # BLD AUTO: 8.9 X10(3)/MCL (ref 4.5–11.5)

## 2024-07-25 PROCEDURE — 99213 OFFICE O/P EST LOW 20 MIN: CPT | Mod: S$GLB,,, | Performed by: NURSE PRACTITIONER

## 2024-07-25 PROCEDURE — 99999 PR PBB SHADOW E&M-EST. PATIENT-LVL IV: CPT | Mod: PBBFAC,,, | Performed by: NURSE PRACTITIONER

## 2024-07-25 PROCEDURE — 85025 COMPLETE CBC W/AUTO DIFF WBC: CPT

## 2024-07-25 PROCEDURE — 1160F RVW MEDS BY RX/DR IN RCRD: CPT | Mod: CPTII,S$GLB,, | Performed by: NURSE PRACTITIONER

## 2024-07-25 PROCEDURE — 1159F MED LIST DOCD IN RCRD: CPT | Mod: CPTII,S$GLB,, | Performed by: NURSE PRACTITIONER

## 2024-07-25 PROCEDURE — 80053 COMPREHEN METABOLIC PANEL: CPT

## 2024-07-25 PROCEDURE — 3008F BODY MASS INDEX DOCD: CPT | Mod: CPTII,S$GLB,, | Performed by: NURSE PRACTITIONER

## 2024-07-25 PROCEDURE — 3074F SYST BP LT 130 MM HG: CPT | Mod: CPTII,S$GLB,, | Performed by: NURSE PRACTITIONER

## 2024-07-25 PROCEDURE — 3078F DIAST BP <80 MM HG: CPT | Mod: CPTII,S$GLB,, | Performed by: NURSE PRACTITIONER

## 2024-07-25 PROCEDURE — 36415 COLL VENOUS BLD VENIPUNCTURE: CPT

## 2024-07-25 NOTE — PROGRESS NOTES
Subjective:       Patient ID: Daryl Musa is a 34 y.o. female.    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  Factor V Leiden heterozygous mutation  History of bilateral pulmonary emboli at age 17 provoked by oral contraceptive pills.    Current Treatment:   Prophylactic Lovenox.     Treatment History:  Lovenox with bridge to warfarin, continued warfarin for 9 months in 2008.    HPI:  34-year-old female who was started on oral contraceptive pills in 2008 at the age of 17, she then developed bilateral pulmonary emboli.  At that time, she was checked for DVT, this was negative.  She underwent hypercoagulable workup that showed that she was heterozygous for factor 5 Leiden.  She was started on Lovenox and bridged to warfarin.  She then treated with warfarin for 9 months.  After that, she was done with therapy for a provoked blood clot.  In October of 2020, the patient had a CBC that was normal including a normal platelet count.  She was then diagnosed with COVID-19 on multiple occasions, including 08/17/2022.  On 11/17/2022, she had a CBC that revealed a platelet count of 78,000. Repeat on 11/29/2022 showed increase in her platelet count, however she still had thrombocytopenia with a platelet count of 108,000. The patient was on baby aspirin at that time, stopped taking baby aspirin and had a repeat CBC on 01/04/2023 that showed normalization of her platelet count.    Patient subsequently became pregnant, to her factor 5 Leiden heterozygous state, she was started on prophylactic Lovenox.  She  underwent blood work on 01/24/2024 that revealed a platelet count down to 84,000.  The rest of the CBC was within normal limits.  Due to her likely ITP along with a factor 5 Leiden heterozygous state, she was referred to Hematology.  I saw her on 02/07/2024.  At that visit, she stated that overall she was doing well and she had no major issues to discuss.   She denied any bleeding or bruising.    Interval History:   Patient presents to clinic for scheduled follow up appointment.  Overall she was doing well, She is currently 36 weeks pregnant.  She is scheduled for an induction next week.  She denies any abnormal bleeding. She continues lovenox 40 mg daily.      Past Medical History:   Diagnosis Date    Anxiety     Dyspareunia     Endometriosis, unspecified     Factor 5 Leiden mutation, heterozygous     Fibroid     Infertility, female     Migraine     Obesity, unspecified     Other pulmonary embolism without acute cor pulmonale       Past Surgical History:   Procedure Laterality Date    bunionectomy left foot Left     CERVICAL BIOPSY  W/ LOOP ELECTRODE EXCISION      LAPAROSCOPY      Dx:Emosis    LYSIS OF ADHESIONS  10/16/2019    MOUTH SURGERY      WISDOM TOOTH EXTRACTION       Social History     Socioeconomic History    Marital status:      Spouse name: melissa    Number of children: 0   Occupational History    Occupation: [employer] Tech   Tobacco Use    Smoking status: Former     Current packs/day: 0.00     Average packs/day: 0.3 packs/day for 10.0 years (2.5 ttl pk-yrs)     Types: Cigarettes     Start date:      Quit date:      Years since quittin.5    Smokeless tobacco: Never    Tobacco comments:     Quit 1-1/2 years ago.   Substance and Sexual Activity    Alcohol use: Not Currently     Comment: 1-2 times a month    Drug use: Never    Sexual activity: Yes     Partners: Male     Birth control/protection: None     Social Determinants of Health     Financial Resource Strain: Low Risk  (2023)    Overall Financial Resource Strain (CARDIA)     Difficulty of Paying Living Expenses: Not hard at all   Food Insecurity: No Food Insecurity (2023)    Hunger Vital Sign     Worried About Running Out of Food in the Last Year: Never true     Ran Out of Food in the Last Year: Never true   Transportation Needs: No Transportation Needs (2023)     PRAPARE - Transportation     Lack of Transportation (Medical): No     Lack of Transportation (Non-Medical): No   Physical Activity: Insufficiently Active (12/11/2023)    Exercise Vital Sign     Days of Exercise per Week: 3 days     Minutes of Exercise per Session: 40 min   Stress: No Stress Concern Present (12/11/2023)    [employer] - Occupational Stress Questionnaire     Feeling of Stress : Not at all   Housing Stability: Low Risk  (12/11/2023)    Housing Stability Vital Sign     Unable to Pay for Housing in the Last Year: No     Number of Places Lived in the Last Year: 1     Unstable Housing in the Last Year: No      Family History   Problem Relation Name Age of Onset    Cancer Mother Sejal Woods     Breast cancer Mother Sejal Woods     Migraines Mother Sejal Woods     No Known Problems Sister        Review of patient's allergies indicates:   Allergen Reactions    Hydrocodone-acetaminophen Hives, Itching and Nausea And Vomiting    Adhesive Other (See Comments)     sensitivity to bandaids and surgical drapes    Hydrocodone Rash and Nausea And Vomiting      Review of Systems   Constitutional:  Negative for appetite change and unexpected weight change.   HENT:  Negative for mouth sores.    Eyes:  Negative for visual disturbance.   Respiratory:  Negative for cough and shortness of breath.    Cardiovascular:  Negative for chest pain.   Gastrointestinal:  Negative for abdominal pain and diarrhea.   Genitourinary:  Negative for frequency.   Musculoskeletal:  Negative for back pain.   Integumentary:  Negative for rash.   Neurological:  Negative for headaches.   Hematological:  Negative for adenopathy.   Psychiatric/Behavioral:  The patient is not nervous/anxious.          Objective:      Physical Exam  Vitals reviewed.   Constitutional:       General: She is not in acute distress.     Appearance: Normal appearance.   HENT:      Head: Normocephalic and atraumatic.      Nose:  Nose normal.      Mouth/Throat:      Mouth: Mucous membranes are moist.   Eyes:      Extraocular Movements: Extraocular movements intact.      Conjunctiva/sclera: Conjunctivae normal.   Pulmonary:      Effort: Pulmonary effort is normal.   Abdominal:      Comments: gravid   Musculoskeletal:         General: No swelling. Normal range of motion.      Cervical back: Normal range of motion and neck supple.      Right lower leg: No edema.      Left lower leg: No edema.   Skin:     General: Skin is warm and dry.   Neurological:      General: No focal deficit present.      Mental Status: She is alert and oriented to person, place, and time. Mental status is at baseline.   Psychiatric:         Mood and Affect: Mood normal.         Behavior: Behavior normal.         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  Factor V Leiden heterozygous mutation  History of bilateral pulmonary emboli at age 17 provoked by oral contraceptive pills.        Plan:       At this time, the patient has a factor 5 Leiden heterozygous mutation and likely ITP.    She is 36 weeks pregnant as of today.    I agree with prophylactic Lovenox at current dose.    Her CBC today, 7/25/2024 revealed a platelet count of 81,000.    Plan to see her every 4 weeks until she was about 36 weeks' gestation.  We will then plan to see her once every 2 weeks.  If she ever needed treatment to get her platelet count above 80,000 so that she could receive an epidural, we would discuss treating with high-dose steroids and IVIG.    Return to clinic in 6 weeks with same day labs    Melvi Trimble NP   Oncology/Hematology  [facility]

## 2024-07-29 ENCOUNTER — TELEPHONE (OUTPATIENT)
Dept: MATERNAL FETAL MEDICINE | Facility: CLINIC | Age: 34
End: 2024-07-29
Payer: COMMERCIAL

## 2024-07-29 NOTE — TELEPHONE ENCOUNTER
Per Dr. Christensen after reviewing glucose log, increase Lantus from 10 u to 12u QHS, Metformin dose will stay the same. Pt has been informed.

## 2024-08-05 ENCOUNTER — HOSPITAL ENCOUNTER (INPATIENT)
Facility: HOSPITAL | Age: 34
LOS: 3 days | Discharge: HOME | End: 2024-08-08
Attending: OBSTETRICS & GYNECOLOGY | Admitting: OBSTETRICS & GYNECOLOGY
Payer: COMMERCIAL

## 2024-08-05 DIAGNOSIS — Z98.890 H/O LEEP (LOOP ELECTROSURGICAL EXCISION PROCEDURE) OF CERVIX COMPLICATING PREGNANCY, THIRD TRIMESTER: ICD-10-CM

## 2024-08-05 DIAGNOSIS — O24.415 GESTATIONAL DIABETES MELLITUS (GDM) IN THIRD TRIMESTER CONTROLLED ON ORAL HYPOGLYCEMIC DRUG: ICD-10-CM

## 2024-08-05 DIAGNOSIS — O35.EXX0 PYELECTASIS OF FETUS ON PRENATAL ULTRASOUND: ICD-10-CM

## 2024-08-05 DIAGNOSIS — F41.9 ANXIETY: ICD-10-CM

## 2024-08-05 DIAGNOSIS — Z72.0 TOBACCO USE: ICD-10-CM

## 2024-08-05 DIAGNOSIS — Z34.90 ENCOUNTER FOR INDUCTION OF LABOR: ICD-10-CM

## 2024-08-05 DIAGNOSIS — D69.6 THROMBOCYTOPENIA AFFECTING PREGNANCY: ICD-10-CM

## 2024-08-05 DIAGNOSIS — D68.51 FACTOR 5 LEIDEN MUTATION, HETEROZYGOUS: ICD-10-CM

## 2024-08-05 DIAGNOSIS — Z23 IMMUNIZATION DUE: ICD-10-CM

## 2024-08-05 DIAGNOSIS — O99.343 MENTAL DISORDER AFFECTING PREGNANCY IN THIRD TRIMESTER: ICD-10-CM

## 2024-08-05 DIAGNOSIS — Z3A.38 38 WEEKS GESTATION OF PREGNANCY: ICD-10-CM

## 2024-08-05 DIAGNOSIS — O99.119 FACTOR V LEIDEN MUTATION AFFECTING PREGNANCY: ICD-10-CM

## 2024-08-05 DIAGNOSIS — D68.51 FACTOR V LEIDEN MUTATION AFFECTING PREGNANCY: ICD-10-CM

## 2024-08-05 DIAGNOSIS — O34.43 H/O LEEP (LOOP ELECTROSURGICAL EXCISION PROCEDURE) OF CERVIX COMPLICATING PREGNANCY, THIRD TRIMESTER: ICD-10-CM

## 2024-08-05 DIAGNOSIS — O36.8390 NON-REASSURING FETAL HEART RATE WITH LATE DECELERATION: ICD-10-CM

## 2024-08-05 DIAGNOSIS — O99.119 THROMBOCYTOPENIA AFFECTING PREGNANCY: ICD-10-CM

## 2024-08-05 DIAGNOSIS — Z91.09 ENVIRONMENTAL ALLERGIES: ICD-10-CM

## 2024-08-05 DIAGNOSIS — O09.513 PRIMIGRAVIDA OF ADVANCED MATERNAL AGE IN THIRD TRIMESTER: ICD-10-CM

## 2024-08-05 LAB
ABORH RETYPE: NORMAL
BASOPHILS # BLD AUTO: 0.01 X10(3)/MCL
BASOPHILS NFR BLD AUTO: 0.1 %
EOSINOPHIL # BLD AUTO: 0.03 X10(3)/MCL (ref 0–0.9)
EOSINOPHIL NFR BLD AUTO: 0.3 %
ERYTHROCYTE [DISTWIDTH] IN BLOOD BY AUTOMATED COUNT: 15.3 % (ref 11.5–17)
GROUP & RH: NORMAL
HCT VFR BLD AUTO: 37.1 % (ref 37–47)
HGB BLD-MCNC: 12 G/DL (ref 12–16)
IMM GRANULOCYTES # BLD AUTO: 0.05 X10(3)/MCL (ref 0–0.04)
IMM GRANULOCYTES NFR BLD AUTO: 0.5 %
INDIRECT COOMBS: NORMAL
LYMPHOCYTES # BLD AUTO: 2.1 X10(3)/MCL (ref 0.6–4.6)
LYMPHOCYTES NFR BLD AUTO: 21.8 %
MCH RBC QN AUTO: 27.5 PG (ref 27–31)
MCHC RBC AUTO-ENTMCNC: 32.3 G/DL (ref 33–36)
MCV RBC AUTO: 84.9 FL (ref 80–94)
MONOCYTES # BLD AUTO: 0.58 X10(3)/MCL (ref 0.1–1.3)
MONOCYTES NFR BLD AUTO: 6 %
NEUTROPHILS # BLD AUTO: 6.88 X10(3)/MCL (ref 2.1–9.2)
NEUTROPHILS NFR BLD AUTO: 71.3 %
NRBC BLD AUTO-RTO: 0 %
PLATELET # BLD AUTO: 97 X10(3)/MCL (ref 130–400)
PLATELETS.RETICULATED NFR BLD AUTO: 11.4 % (ref 0.9–11.2)
PMV BLD AUTO: 12.9 FL (ref 7.4–10.4)
POCT GLUCOSE: 131 MG/DL (ref 70–110)
RBC # BLD AUTO: 4.37 X10(6)/MCL (ref 4.2–5.4)
SPECIMEN OUTDATE: NORMAL
T PALLIDUM AB SER QL: NONREACTIVE
WBC # BLD AUTO: 9.65 X10(3)/MCL (ref 4.5–11.5)

## 2024-08-05 PROCEDURE — 86780 TREPONEMA PALLIDUM: CPT | Performed by: OBSTETRICS & GYNECOLOGY

## 2024-08-05 PROCEDURE — 3E0P7VZ INTRODUCTION OF HORMONE INTO FEMALE REPRODUCTIVE, VIA NATURAL OR ARTIFICIAL OPENING: ICD-10-PCS | Performed by: OBSTETRICS & GYNECOLOGY

## 2024-08-05 PROCEDURE — 25000003 PHARM REV CODE 250: Performed by: OBSTETRICS & GYNECOLOGY

## 2024-08-05 PROCEDURE — 86901 BLOOD TYPING SEROLOGIC RH(D): CPT | Performed by: OBSTETRICS & GYNECOLOGY

## 2024-08-05 PROCEDURE — 11000001 HC ACUTE MED/SURG PRIVATE ROOM

## 2024-08-05 PROCEDURE — 63600175 PHARM REV CODE 636 W HCPCS: Performed by: OBSTETRICS & GYNECOLOGY

## 2024-08-05 PROCEDURE — 86900 BLOOD TYPING SEROLOGIC ABO: CPT | Performed by: OBSTETRICS & GYNECOLOGY

## 2024-08-05 PROCEDURE — 85025 COMPLETE CBC W/AUTO DIFF WBC: CPT | Performed by: OBSTETRICS & GYNECOLOGY

## 2024-08-05 PROCEDURE — 86850 RBC ANTIBODY SCREEN: CPT | Performed by: OBSTETRICS & GYNECOLOGY

## 2024-08-05 PROCEDURE — 36415 COLL VENOUS BLD VENIPUNCTURE: CPT | Performed by: OBSTETRICS & GYNECOLOGY

## 2024-08-05 PROCEDURE — 86923 COMPATIBILITY TEST ELECTRIC: CPT | Mod: 91 | Performed by: OBSTETRICS & GYNECOLOGY

## 2024-08-05 RX ORDER — OXYTOCIN-SODIUM CHLORIDE 0.9% IV SOLN 30 UNIT/500ML 30-0.9/5 UT/ML-%
10 SOLUTION INTRAVENOUS ONCE
Status: DISCONTINUED | OUTPATIENT
Start: 2024-08-05 | End: 2024-08-06

## 2024-08-05 RX ORDER — MUPIROCIN 20 MG/G
OINTMENT TOPICAL
Status: DISCONTINUED | OUTPATIENT
Start: 2024-08-05 | End: 2024-08-06

## 2024-08-05 RX ORDER — CALCIUM CARBONATE 200(500)MG
500 TABLET,CHEWABLE ORAL 3 TIMES DAILY PRN
Status: DISCONTINUED | OUTPATIENT
Start: 2024-08-05 | End: 2024-08-06

## 2024-08-05 RX ORDER — OXYTOCIN-SODIUM CHLORIDE 0.9% IV SOLN 30 UNIT/500ML 30-0.9/5 UT/ML-%
95 SOLUTION INTRAVENOUS ONCE
Status: DISCONTINUED | OUTPATIENT
Start: 2024-08-05 | End: 2024-08-06 | Stop reason: SDUPTHER

## 2024-08-05 RX ORDER — OXYTOCIN 10 [USP'U]/ML
10 INJECTION, SOLUTION INTRAMUSCULAR; INTRAVENOUS ONCE AS NEEDED
Status: DISCONTINUED | OUTPATIENT
Start: 2024-08-05 | End: 2024-08-06

## 2024-08-05 RX ORDER — SODIUM CHLORIDE, SODIUM LACTATE, POTASSIUM CHLORIDE, CALCIUM CHLORIDE 600; 310; 30; 20 MG/100ML; MG/100ML; MG/100ML; MG/100ML
INJECTION, SOLUTION INTRAVENOUS CONTINUOUS
Status: DISCONTINUED | OUTPATIENT
Start: 2024-08-05 | End: 2024-08-06

## 2024-08-05 RX ORDER — DIPHENOXYLATE HYDROCHLORIDE AND ATROPINE SULFATE 2.5; .025 MG/1; MG/1
2 TABLET ORAL EVERY 6 HOURS PRN
Status: DISCONTINUED | OUTPATIENT
Start: 2024-08-05 | End: 2024-08-06

## 2024-08-05 RX ORDER — SIMETHICONE 80 MG
1 TABLET,CHEWABLE ORAL 4 TIMES DAILY PRN
Status: DISCONTINUED | OUTPATIENT
Start: 2024-08-05 | End: 2024-08-06

## 2024-08-05 RX ORDER — ONDANSETRON 4 MG/1
8 TABLET, ORALLY DISINTEGRATING ORAL EVERY 8 HOURS PRN
Status: DISCONTINUED | OUTPATIENT
Start: 2024-08-05 | End: 2024-08-06

## 2024-08-05 RX ORDER — MISOPROSTOL 100 UG/1
800 TABLET ORAL ONCE AS NEEDED
Status: DISCONTINUED | OUTPATIENT
Start: 2024-08-05 | End: 2024-08-06

## 2024-08-05 RX ORDER — CARBOPROST TROMETHAMINE 250 UG/ML
250 INJECTION, SOLUTION INTRAMUSCULAR
Status: DISCONTINUED | OUTPATIENT
Start: 2024-08-05 | End: 2024-08-06

## 2024-08-05 RX ORDER — METHYLERGONOVINE MALEATE 0.2 MG/ML
200 INJECTION INTRAVENOUS ONCE AS NEEDED
Status: DISCONTINUED | OUTPATIENT
Start: 2024-08-05 | End: 2024-08-06

## 2024-08-05 RX ORDER — OXYTOCIN-SODIUM CHLORIDE 0.9% IV SOLN 30 UNIT/500ML 30-0.9/5 UT/ML-%
10 SOLUTION INTRAVENOUS ONCE AS NEEDED
Status: DISCONTINUED | OUTPATIENT
Start: 2024-08-05 | End: 2024-08-06

## 2024-08-05 RX ORDER — LIDOCAINE HYDROCHLORIDE 10 MG/ML
10 INJECTION, SOLUTION INFILTRATION; PERINEURAL ONCE AS NEEDED
Status: DISCONTINUED | OUTPATIENT
Start: 2024-08-05 | End: 2024-08-06

## 2024-08-05 RX ORDER — OXYTOCIN-SODIUM CHLORIDE 0.9% IV SOLN 30 UNIT/500ML 30-0.9/5 UT/ML-%
95 SOLUTION INTRAVENOUS ONCE AS NEEDED
Status: DISCONTINUED | OUTPATIENT
Start: 2024-08-05 | End: 2024-08-06

## 2024-08-05 RX ADMIN — DINOPROSTONE 10 MG: 10 INSERT VAGINAL at 09:08

## 2024-08-05 RX ADMIN — SODIUM CHLORIDE, POTASSIUM CHLORIDE, SODIUM LACTATE AND CALCIUM CHLORIDE: 600; 310; 30; 20 INJECTION, SOLUTION INTRAVENOUS at 09:08

## 2024-08-06 PROBLEM — O36.8390 NON-REASSURING FETAL HEART RATE WITH LATE DECELERATION: Status: ACTIVE | Noted: 2024-08-06

## 2024-08-06 PROBLEM — Z3A.38 38 WEEKS GESTATION OF PREGNANCY: Status: ACTIVE | Noted: 2024-08-06

## 2024-08-06 LAB
POCT GLUCOSE: 71 MG/DL (ref 70–110)
POCT GLUCOSE: 76 MG/DL (ref 70–110)
POCT GLUCOSE: 90 MG/DL (ref 70–110)
POCT GLUCOSE: 99 MG/DL (ref 70–110)

## 2024-08-06 PROCEDURE — 99900035 HC TECH TIME PER 15 MIN (STAT)

## 2024-08-06 PROCEDURE — 72100002 HC LABOR CARE, 1ST 8 HOURS

## 2024-08-06 PROCEDURE — 63600175 PHARM REV CODE 636 W HCPCS: Performed by: OBSTETRICS & GYNECOLOGY

## 2024-08-06 PROCEDURE — 51702 INSERT TEMP BLADDER CATH: CPT

## 2024-08-06 PROCEDURE — 37000008 HC ANESTHESIA 1ST 15 MINUTES: Performed by: OBSTETRICS & GYNECOLOGY

## 2024-08-06 PROCEDURE — 27000492 HC SLEEVE, SCD T/L

## 2024-08-06 PROCEDURE — 27000181 HC CABLE, IUPC

## 2024-08-06 PROCEDURE — 25000003 PHARM REV CODE 250: Performed by: OBSTETRICS & GYNECOLOGY

## 2024-08-06 PROCEDURE — 71000033 HC RECOVERY, INTIAL HOUR: Performed by: OBSTETRICS & GYNECOLOGY

## 2024-08-06 PROCEDURE — 36004724 HC OB OR TIME LEV III - 1ST 15 MIN: Performed by: OBSTETRICS & GYNECOLOGY

## 2024-08-06 PROCEDURE — 25000003 PHARM REV CODE 250: Performed by: ANESTHESIOLOGY

## 2024-08-06 PROCEDURE — 11000001 HC ACUTE MED/SURG PRIVATE ROOM

## 2024-08-06 PROCEDURE — 27201423 OPTIME MED/SURG SUP & DEVICES STERILE SUPPLY: Performed by: OBSTETRICS & GYNECOLOGY

## 2024-08-06 PROCEDURE — 37000009 HC ANESTHESIA EA ADD 15 MINS: Performed by: OBSTETRICS & GYNECOLOGY

## 2024-08-06 PROCEDURE — 36004725 HC OB OR TIME LEV III - EA ADD 15 MIN: Performed by: OBSTETRICS & GYNECOLOGY

## 2024-08-06 RX ORDER — OXYTOCIN-SODIUM CHLORIDE 0.9% IV SOLN 30 UNIT/500ML 30-0.9/5 UT/ML-%
95 SOLUTION INTRAVENOUS ONCE AS NEEDED
Status: DISCONTINUED | OUTPATIENT
Start: 2024-08-06 | End: 2024-08-08 | Stop reason: HOSPADM

## 2024-08-06 RX ORDER — DIPHENHYDRAMINE HCL 25 MG
25 CAPSULE ORAL EVERY 4 HOURS PRN
Status: DISCONTINUED | OUTPATIENT
Start: 2024-08-06 | End: 2024-08-08 | Stop reason: HOSPADM

## 2024-08-06 RX ORDER — OXYCODONE AND ACETAMINOPHEN 10; 325 MG/1; MG/1
1 TABLET ORAL EVERY 4 HOURS PRN
Status: DISCONTINUED | OUTPATIENT
Start: 2024-08-06 | End: 2024-08-08 | Stop reason: HOSPADM

## 2024-08-06 RX ORDER — OXYTOCIN-SODIUM CHLORIDE 0.9% IV SOLN 30 UNIT/500ML 30-0.9/5 UT/ML-%
10 SOLUTION INTRAVENOUS ONCE AS NEEDED
Status: DISCONTINUED | OUTPATIENT
Start: 2024-08-06 | End: 2024-08-08 | Stop reason: HOSPADM

## 2024-08-06 RX ORDER — ADHESIVE BANDAGE
30 BANDAGE TOPICAL 2 TIMES DAILY PRN
Status: DISCONTINUED | OUTPATIENT
Start: 2024-08-07 | End: 2024-08-08 | Stop reason: HOSPADM

## 2024-08-06 RX ORDER — MISOPROSTOL 100 UG/1
400 TABLET ORAL ONCE
Status: COMPLETED | OUTPATIENT
Start: 2024-08-06 | End: 2024-08-06

## 2024-08-06 RX ORDER — OXYCODONE AND ACETAMINOPHEN 5; 325 MG/1; MG/1
1 TABLET ORAL EVERY 4 HOURS PRN
Status: DISCONTINUED | OUTPATIENT
Start: 2024-08-06 | End: 2024-08-08 | Stop reason: HOSPADM

## 2024-08-06 RX ORDER — NALBUPHINE HYDROCHLORIDE 10 MG/ML
2.5 INJECTION, SOLUTION INTRAMUSCULAR; INTRAVENOUS; SUBCUTANEOUS ONCE
Status: DISCONTINUED | OUTPATIENT
Start: 2024-08-06 | End: 2024-08-06

## 2024-08-06 RX ORDER — MORPHINE SULFATE 4 MG/ML
4 INJECTION, SOLUTION INTRAMUSCULAR; INTRAVENOUS
Status: DISCONTINUED | OUTPATIENT
Start: 2024-08-06 | End: 2024-08-08 | Stop reason: HOSPADM

## 2024-08-06 RX ORDER — MISOPROSTOL 100 UG/1
800 TABLET ORAL ONCE AS NEEDED
Status: DISCONTINUED | OUTPATIENT
Start: 2024-08-06 | End: 2024-08-08 | Stop reason: HOSPADM

## 2024-08-06 RX ORDER — FENTANYL/BUPIVACAINE/NS/PF 2-1250MCG
PLASTIC BAG, INJECTION (ML) INJECTION CONTINUOUS
Status: DISCONTINUED | OUTPATIENT
Start: 2024-08-06 | End: 2024-08-06

## 2024-08-06 RX ORDER — KETOROLAC TROMETHAMINE 30 MG/ML
30 INJECTION, SOLUTION INTRAMUSCULAR; INTRAVENOUS EVERY 6 HOURS
Status: COMPLETED | OUTPATIENT
Start: 2024-08-06 | End: 2024-08-07

## 2024-08-06 RX ORDER — FAMOTIDINE 10 MG/ML
20 INJECTION INTRAVENOUS ONCE
Status: DISCONTINUED | OUTPATIENT
Start: 2024-08-06 | End: 2024-08-06

## 2024-08-06 RX ORDER — MORPHINE SULFATE 4 MG/ML
10 INJECTION, SOLUTION INTRAMUSCULAR; INTRAVENOUS
Status: DISCONTINUED | OUTPATIENT
Start: 2024-08-06 | End: 2024-08-08 | Stop reason: HOSPADM

## 2024-08-06 RX ORDER — SODIUM CHLORIDE 0.9 % (FLUSH) 0.9 %
2 SYRINGE (ML) INJECTION
Status: DISCONTINUED | OUTPATIENT
Start: 2024-08-06 | End: 2024-08-08 | Stop reason: HOSPADM

## 2024-08-06 RX ORDER — SIMETHICONE 80 MG
1 TABLET,CHEWABLE ORAL EVERY 4 HOURS PRN
Status: DISCONTINUED | OUTPATIENT
Start: 2024-08-06 | End: 2024-08-08 | Stop reason: HOSPADM

## 2024-08-06 RX ORDER — SODIUM CITRATE AND CITRIC ACID MONOHYDRATE 334; 500 MG/5ML; MG/5ML
30 SOLUTION ORAL ONCE
Status: COMPLETED | OUTPATIENT
Start: 2024-08-06 | End: 2024-08-06

## 2024-08-06 RX ORDER — IBUPROFEN 600 MG/1
600 TABLET ORAL EVERY 6 HOURS
Status: DISCONTINUED | OUTPATIENT
Start: 2024-08-07 | End: 2024-08-08 | Stop reason: HOSPADM

## 2024-08-06 RX ORDER — OXYTOCIN 10 [USP'U]/ML
10 INJECTION, SOLUTION INTRAMUSCULAR; INTRAVENOUS ONCE AS NEEDED
Status: DISCONTINUED | OUTPATIENT
Start: 2024-08-06 | End: 2024-08-08 | Stop reason: HOSPADM

## 2024-08-06 RX ORDER — ONDANSETRON 4 MG/1
8 TABLET, ORALLY DISINTEGRATING ORAL EVERY 8 HOURS PRN
Status: DISCONTINUED | OUTPATIENT
Start: 2024-08-06 | End: 2024-08-08 | Stop reason: HOSPADM

## 2024-08-06 RX ORDER — ACETAMINOPHEN 500 MG
1000 TABLET ORAL ONCE
Status: COMPLETED | OUTPATIENT
Start: 2024-08-06 | End: 2024-08-06

## 2024-08-06 RX ORDER — CARBOPROST TROMETHAMINE 250 UG/ML
250 INJECTION, SOLUTION INTRAMUSCULAR
Status: DISCONTINUED | OUTPATIENT
Start: 2024-08-06 | End: 2024-08-08 | Stop reason: HOSPADM

## 2024-08-06 RX ORDER — DIPHENHYDRAMINE HYDROCHLORIDE 50 MG/ML
25 INJECTION INTRAMUSCULAR; INTRAVENOUS EVERY 4 HOURS PRN
Status: DISCONTINUED | OUTPATIENT
Start: 2024-08-06 | End: 2024-08-08 | Stop reason: HOSPADM

## 2024-08-06 RX ORDER — OXYTOCIN-SODIUM CHLORIDE 0.9% IV SOLN 30 UNIT/500ML 30-0.9/5 UT/ML-%
0-30 SOLUTION INTRAVENOUS CONTINUOUS
Status: DISCONTINUED | OUTPATIENT
Start: 2024-08-06 | End: 2024-08-06

## 2024-08-06 RX ORDER — ALUMINUM HYDROXIDE, MAGNESIUM HYDROXIDE, AND SIMETHICONE 1200; 120; 1200 MG/30ML; MG/30ML; MG/30ML
30 SUSPENSION ORAL EVERY 6 HOURS PRN
Status: DISCONTINUED | OUTPATIENT
Start: 2024-08-06 | End: 2024-08-08 | Stop reason: HOSPADM

## 2024-08-06 RX ORDER — EPHEDRINE SULFATE 50 MG/ML
10 INJECTION, SOLUTION INTRAVENOUS ONCE AS NEEDED
Status: DISCONTINUED | OUTPATIENT
Start: 2024-08-06 | End: 2024-08-06

## 2024-08-06 RX ORDER — ACETAMINOPHEN 325 MG/1
650 TABLET ORAL EVERY 4 HOURS PRN
Status: DISCONTINUED | OUTPATIENT
Start: 2024-08-06 | End: 2024-08-08 | Stop reason: HOSPADM

## 2024-08-06 RX ORDER — DEXTROSE, SODIUM CHLORIDE, SODIUM LACTATE, POTASSIUM CHLORIDE, AND CALCIUM CHLORIDE 5; .6; .31; .03; .02 G/100ML; G/100ML; G/100ML; G/100ML; G/100ML
INJECTION, SOLUTION INTRAVENOUS CONTINUOUS
Status: DISCONTINUED | OUTPATIENT
Start: 2024-08-06 | End: 2024-08-08 | Stop reason: HOSPADM

## 2024-08-06 RX ORDER — DIPHENOXYLATE HYDROCHLORIDE AND ATROPINE SULFATE 2.5; .025 MG/1; MG/1
2 TABLET ORAL EVERY 6 HOURS PRN
Status: DISCONTINUED | OUTPATIENT
Start: 2024-08-06 | End: 2024-08-08 | Stop reason: HOSPADM

## 2024-08-06 RX ORDER — MISOPROSTOL 100 UG/1
400 TABLET ORAL ONCE
Status: DISCONTINUED | OUTPATIENT
Start: 2024-08-06 | End: 2024-08-08 | Stop reason: HOSPADM

## 2024-08-06 RX ORDER — BISACODYL 10 MG/1
10 SUPPOSITORY RECTAL ONCE AS NEEDED
Status: DISCONTINUED | OUTPATIENT
Start: 2024-08-06 | End: 2024-08-08 | Stop reason: HOSPADM

## 2024-08-06 RX ORDER — DOCUSATE SODIUM 100 MG/1
200 CAPSULE, LIQUID FILLED ORAL 2 TIMES DAILY
Status: DISCONTINUED | OUTPATIENT
Start: 2024-08-06 | End: 2024-08-08 | Stop reason: HOSPADM

## 2024-08-06 RX ORDER — OXYTOCIN-SODIUM CHLORIDE 0.9% IV SOLN 30 UNIT/500ML 30-0.9/5 UT/ML-%
95 SOLUTION INTRAVENOUS ONCE
Status: DISCONTINUED | OUTPATIENT
Start: 2024-08-06 | End: 2024-08-08 | Stop reason: HOSPADM

## 2024-08-06 RX ORDER — METHYLERGONOVINE MALEATE 0.2 MG/ML
200 INJECTION INTRAVENOUS ONCE AS NEEDED
Status: DISCONTINUED | OUTPATIENT
Start: 2024-08-06 | End: 2024-08-08 | Stop reason: HOSPADM

## 2024-08-06 RX ADMIN — MISOPROSTOL 400 MCG: 100 TABLET ORAL at 06:08

## 2024-08-06 RX ADMIN — SODIUM CHLORIDE, POTASSIUM CHLORIDE, SODIUM LACTATE AND CALCIUM CHLORIDE 500 ML: 600; 310; 30; 20 INJECTION, SOLUTION INTRAVENOUS at 03:08

## 2024-08-06 RX ADMIN — SODIUM CHLORIDE, POTASSIUM CHLORIDE, SODIUM LACTATE AND CALCIUM CHLORIDE 1000 ML: 600; 310; 30; 20 INJECTION, SOLUTION INTRAVENOUS at 11:08

## 2024-08-06 RX ADMIN — SODIUM CITRATE AND CITRIC ACID MONOHYDRATE 30 ML: 500; 334 SOLUTION ORAL at 11:08

## 2024-08-06 RX ADMIN — SODIUM CHLORIDE, POTASSIUM CHLORIDE, SODIUM LACTATE AND CALCIUM CHLORIDE: 600; 310; 30; 20 INJECTION, SOLUTION INTRAVENOUS at 12:08

## 2024-08-06 RX ADMIN — KETOROLAC TROMETHAMINE 30 MG: 30 INJECTION, SOLUTION INTRAMUSCULAR at 07:08

## 2024-08-06 RX ADMIN — SODIUM CHLORIDE, POTASSIUM CHLORIDE, SODIUM LACTATE AND CALCIUM CHLORIDE: 600; 310; 30; 20 INJECTION, SOLUTION INTRAVENOUS at 05:08

## 2024-08-06 RX ADMIN — Medication 2 MILLI-UNITS/MIN: at 08:08

## 2024-08-06 RX ADMIN — ACETAMINOPHEN 1000 MG: 500 TABLET ORAL at 09:08

## 2024-08-06 NOTE — PLAN OF CARE
Problem: Adult Inpatient Plan of Care  Goal: Plan of Care Review  Outcome: Progressing  Goal: Patient-Specific Goal (Individualized)  Outcome: Progressing  Goal: Absence of Hospital-Acquired Illness or Injury  Outcome: Progressing  Goal: Optimal Comfort and Wellbeing  Outcome: Progressing  Goal: Readiness for Transition of Care  Outcome: Progressing     Problem: Diabetes in Pregnancy  Goal: Optimal Coping  Outcome: Progressing  Goal: Blood Glucose Level Within Targeted Range  Outcome: Progressing     Problem: Infection  Goal: Absence of Infection Signs and Symptoms  Outcome: Progressing     Problem:  Fall Injury Risk  Goal: Absence of Fall, Infant Drop and Related Injury  Outcome: Progressing     Problem: Labor  Goal: Hemostasis  Outcome: Progressing  Goal: Stable Fetal Wellbeing  Outcome: Progressing  Goal: Effective Progression to Delivery  Outcome: Progressing  Goal: Absence of Infection Signs and Symptoms  Outcome: Progressing  Goal: Acceptable Pain Control  Outcome: Progressing  Goal: Normal Uterine Contraction Pattern  Outcome: Progressing

## 2024-08-06 NOTE — PROGRESS NOTES
08/06/24 1148   Vital Signs   Pulse 69   Uterine Activity (PeriWATCH)   Monitor Mode External [place]   Contraction Frequency (min) 2-3.5   Contraction Durations (sec) 60-80   Contraction Intensity Moderate/Strong   Uterine Tone (PeriWATCH)   Resting Tone (palp) Soft   Uterine Characteristics Regular   Fetal Assessment (PeriWATCH) Baby A   Fetal Monitor Mode Ultrasound   Baseline    Variability Moderate   Decels None   Rhythm Regular   FA Characteristics Normal     EFM and toco removed from soft, nontender abdomen for placement of NILO

## 2024-08-06 NOTE — L&D DELIVERY NOTE
[facility] - Labor and Delivery   Section   Operative Note    SUMMARY     Date of Procedure: 2024     Procedure: Procedure(s) (LRB):   SECTION (N/A)    Surgeons and Role:     * Singh Marshall MD - Primary    Assisting Surgeon: Santiago Arriaga Jr MD    Pre-Operative Diagnosis: Encounter for induction of labor [Z34.90]    Post-Operative Diagnosis: Post-Op Diagnosis Codes:     * Encounter for induction of labor [Z34.90]    Anesthesia: Spinal/Epidural    Technical Procedures Used: LTCS           Description of the Findings of the Procedure: meconium stained fluid, nuchal cord    Significant Surgical Tasks Conducted by the Assistant(s), if Applicable: assisted with opening, delivery of baby and closure    Complications: No    Blood Loss: * No values recorded between 2024  5:36 PM and 2024  6:15 PM *     With patient in supine position, the legs are  and Long Catheter placed and positioning to supine done.   Abdomen prepped with Chloroprep and 3 minute drying time allowed prior to draping of the abdomen.   Time out taken with OR team members.  Pfannenstiel Incision made through the skin, transverse fascial incision developed, rectus muscles  in the midline and the peritoneum entered.   no adhesions noted.  The lower uterine segment and position of the fetus identified.   Bladder flap taken down through transverse peritoneal incision.    Low Transverse Incision made through well developer lower uterine segment and extended laterally with blunt dissection.   Thick meconium fluid noted.  Infant delivered from vertex presentation.  Cord clamped after one minute and  handed to attending nurse.  Cord blood taken, placenta delivered.  The uterus wasnot exteriorized.  The edges of the uterine incision are grasped with Lopez clamps at the angles and the inferior and superior midline edges of the incision.    Closure with running lock 0 Chromic, starting  at each angle, tying in the midline.   Observation for bleeding with suture of any bleeding along the hysterotomy line.   With good hemostasis noted, the anterior pelvis is rinsed with sterile saline.   Right and left adnexa with normal anatomy.     Closure of the abdomen with 2 0 Vicryl running of the peritoneum, fascial closure with 0 looped PDS starting at the right angle and tying the knot at the left angle.  Skin closure with 4 0 Vicryl subcuticular.  Wound dressed with telfa/tegaderm.          Specimens:   Specimen (24h ago, onward)      None            Condition: Good    VTE Risk Mitigation (From admission, onward)           Ordered     IP VTE HIGH RISK PATIENT  Once         24     Place sequential compression device  Until discontinued         24                    Disposition: PACU - hemodynamically stable.    Attestation: Good         Delivery Information for Olga Musa    Birth information:  YOB: 2026   Time of birth: 5:45 PM   Sex: female   Head Delivery Date/Time: 2024  5:45 PM   Delivery type: , Low Transverse   Gestational Age: 38w0d        Delivery Providers    Delivering clinician: Singh Marshall MD   Provider Role    Isabella Morales RN Delivery Nurse              Measurements    Weight:   Length:          Apgars    Living status: Living  Apgar Component Scores:  1 min.:  5 min.:  10 min.:  15 min.:  20 min.:    Skin color:         Heart rate:         Reflex irritability:         Muscle tone:         Respiratory effort:         Total:                  Operative Delivery    Forceps attempted?: No  Vacuum extractor attempted?: No         Shoulder Dystocia    Shoulder dystocia present?: No           Presentation    Presentation: Vertex           Interventions/Resuscitation           Cord    Vessels: 3 vessels  Complications: None  Delayed Cord Clamping?: No  Cord Blood Disposition: Sent with Baby  Gases Sent?: No  Stem Cell Collection (by  MD): No       Placenta    Placenta delivery date/time: 2024 1746  Placenta removal: Manual removal  Placenta appearance: Intact  Placenta disposition: Discarded           Labor Events:       labor: No     Labor Onset Date/Time: 2024 21:56     Dilation Complete Date/Time:         Start Pushing Date/Time:       Rupture Date/Time: 24  1040         Rupture type: SRM (Spontaneous Rupture)         Fluid Amount:       Fluid Color:        steroids: None     Antibiotics given for GBS: No     Induction: dinoprostone insert     Indications for induction:  Gestational Diabetic     Augmentation: oxytocin     Indications for augmentation: Ineffective Contraction Pattern     Labor complications: Fetal Intolerance     Additional complications:          Cervical ripenin2024 9:56 PM      Cervidil          Delivery:      Episiotomy: None     Indication for Episiotomy:       Perineal Lacerations: None Repaired:      Periurethral Laceration:   Repaired:     Labial Laceration:   Repaired:     Sulcus Laceration:   Repaired:     Vaginal Laceration:   Repaired:     Cervical Laceration:   Repaired:     Repair suture: None     Repair # of packets:       Last Value - EBL - Nursing (mL):       Sum - EBL - Nursing (mL): 0     Last Value - EBL - Anesthesia (mL):      Calculated QBL (mL): 480      Running total QBL (mL): 480      Vaginal Sweep Performed: No     Surgicount Correct: Yes       Other providers:       Anesthesia    Method: Epidural          Details (if applicable):  Trial of Labor Yes    Categorization: Primary    Priority: Urgent   Indications for : Fetal heart rate abnormalities;Failed induction   Incision Type: low transverse     Additional  information:  Forceps:    Vacuum:    Breech:    Observed anomalies    Other (Comments):

## 2024-08-06 NOTE — H&P
[facility] - Labor and Delivery  Obstetrics  History & Physical    Patient Name: Daryl Musa  MRN: [id number]  Admission Date: 2024  Primary Care Provider: Dileep Olson MD    Subjective:     Principal Problem:38 weeks gestation of pregnancy    History of Present Illness:   1 para 0 admitted for induction of labor at 38 weeks gestation.  This patient has a history of Leiden factor 5 and has been treated during this pregnancy with Lovenox 40 mg daily.  She was converted to heparin over the last 3 weeks in anticipation of delivery.  She has not received any heparin since .  She has also been followed by GI for gestational diabetes and has been insulin dependent.  This has been managed by Dr. Jenna joshi Maternal-Fetal medicine specialist.  She also has a history of a LEEP excision of her cervix.    Obstetric HPI:  Patient reports None contractions, active fetal movement, No vaginal bleeding , No loss of fluid     This pregnancy has been complicated by gestational diabetes, history of Leiden factor 5, thrombocytopenia, previous LEEP excision of the cervix,    OB History    Para Term  AB Living   1 0 0 0 0 0   SAB IAB Ectopic Multiple Live Births   0 0 0 0 0      # Outcome Date GA Lbr Real/2nd Weight Sex Type Anes PTL Lv   1 Current              Past Medical History:   Diagnosis Date    Anxiety     Dyspareunia     Endometriosis, unspecified     Factor 5 Leiden mutation, heterozygous     Fibroid     Infertility, female     Migraine     Obesity, unspecified     Other pulmonary embolism without acute cor pulmonale      Past Surgical History:   Procedure Laterality Date    bunionectomy left foot Left     CERVICAL BIOPSY  W/ LOOP ELECTRODE EXCISION      LAPAROSCOPY      Dx:Emosis    LAPAROSCOPY      Endometriosis    LYSIS OF ADHESIONS  10/16/2019    MOUTH SURGERY      WISDOM TOOTH EXTRACTION         PTA Medications   Medication Sig    cetirizine (ZYRTEC) 10 MG tablet  Take 10 mg by mouth once daily.    enoxaparin (LOVENOX) 40 mg/0.4 mL Syrg Inject 0.4 mLs (40 mg total) into the skin once daily.    insulin glargine U-100, Lantus, (LANTUS SOLOSTAR U-100 INSULIN) 100 unit/mL (3 mL) InPn pen Inject 10 Units into the skin every evening.    prenatal vit no.124/iron/folic (PRENATAL VITAMIN ORAL) Take by mouth once daily.    sertraline (ZOLOFT) 50 MG tablet TAKE 1 TABLET BY MOUTH EVERY DAY    ACCU-CHEK GUIDE TEST STRIPS Strp 4 (four) times daily.    ACCU-CHEK SOFTCLIX LANCETS Misc Apply topically 4 (four) times daily.    metFORMIN (GLUCOPHAGE) 500 MG tablet Take 1 tablet (500 mg total) by mouth every evening. (Patient taking differently: Take 1,000 mg by mouth every evening.)       Review of patient's allergies indicates:   Allergen Reactions    Hydrocodone-acetaminophen Hives, Itching and Nausea And Vomiting    Adhesive Other (See Comments)     sensitivity to bandaids and surgical drapes    Hydrocodone Rash and Nausea And Vomiting        Family History       Problem Relation (Age of Onset)    Breast cancer Mother    Cancer Mother    Migraines Mother    No Known Problems Sister          Tobacco Use    Smoking status: Former     Current packs/day: 0.00     Average packs/day: 0.3 packs/day for 10.0 years (2.5 ttl pk-yrs)     Types: Cigarettes     Start date:      Quit date:      Years since quittin.5    Smokeless tobacco: Never    Tobacco comments:     Quit 1-1/2 years ago.   Substance and Sexual Activity    Alcohol use: Not Currently     Comment: 1-2 times a month    Drug use: Never    Sexual activity: Yes     Partners: Male     Birth control/protection: None     Review of Systems   All other systems reviewed and are negative.     Objective:     Vital Signs (Most Recent):  Temp: 98.2 °F (36.8 °C) (24)  Pulse: 60 (24 0504)  Resp: 15 (24)  BP: (!) 117/54 (24 0453)  SpO2: 97 % (24 0502) Vital Signs (24h Range):  Temp:  [98.2 °F (36.8  °C)-98.3 °F (36.8 °C)] 98.2 °F (36.8 °C)  Pulse:  [] 60  Resp:  [15-17] 15  SpO2:  [93 %-98 %] 97 %  BP: (105-133)/(54-71) 117/54     Weight: 93.4 kg (206 lb)  Body mass index is 32.26 kg/m².    FHT:  145 Cat 0 (reassuring)  TOCO:  Q 5 minutes    Physical Exam:   Constitutional: She appears well-developed and well-nourished.    HENT:   Head: Atraumatic.    Eyes: Pupils are equal, round, and reactive to light.     Cardiovascular:  Intact distal pulses.             Pulmonary/Chest: Breath sounds normal.        Abdominal: Bowel sounds are normal.     Genitourinary:    Vagina normal.             Musculoskeletal: Normal range of motion.       Neurological: She is alert.    Skin: Skin is warm and dry.    Psychiatric: She has a normal mood and affect.       Cervix:  Dilation:  1  Effacement:  75%  Station: -2  Presentation: Vertex     Significant Labs:  Lab Results   Component Value Date    GROUPTRH B POS 2024    HEPBSAG NEG 2024    STREPBCULT Neg 2024       I have personallly reviewed all pertinent lab results from the last 24 hours.    Assessment/Plan:     34 y.o. female  at 38w0d for:    Active Diagnoses:    Diagnosis Date Noted POA    PRINCIPAL PROBLEM:  38 weeks gestation of pregnancy [Z3A.38] 2024 Not Applicable    - Gestational diabetes mellitus (GDM) in third trimester controlled on oral hypoglycemic drug [O24.415] 2024 Yes    - Thrombocytopenia affecting pregnancy [O99.119, D69.6] 2024 Yes    - H/O LEEP (loop electrosurgical excision procedure) of cervix complicating pregnancy, third trimester [O34.43, Z98.890] 2024 Not Applicable    Factor 5 Leiden mutation, heterozygous [D68.51]  Yes      Problems Resolved During this Admission:       Thirty-eight weeks' intrauterine pregnancy  1 para 0 admitted for induction of labor.  Cytotec was administered and we anticipate starting Pitocin this morning.    Singh Marshall MD  Obstetrics  [facility] - Labor and Delivery

## 2024-08-06 NOTE — PROGRESS NOTES
08/06/24 1205   Uterine Activity (PeriWATCH)   Monitor Mode External [place]   Uterine Tone (PeriWATCH)   Resting Tone (palp) Soft   Fetal Assessment (PeriWATCH) Baby A   Fetal Monitor Mode Ultrasound   Baseline      EFM and toco reapplied to soft, nontender abdomen after placement of NILO

## 2024-08-07 LAB
BASOPHILS # BLD AUTO: 0.02 X10(3)/MCL
BASOPHILS NFR BLD AUTO: 0.1 %
EOSINOPHIL # BLD AUTO: 0.01 X10(3)/MCL (ref 0–0.9)
EOSINOPHIL NFR BLD AUTO: 0.1 %
ERYTHROCYTE [DISTWIDTH] IN BLOOD BY AUTOMATED COUNT: 15.5 % (ref 11.5–17)
HCT VFR BLD AUTO: 31.7 % (ref 37–47)
HCT VFR BLD AUTO: 32.7 % (ref 37–47)
HGB BLD-MCNC: 10.2 G/DL (ref 12–16)
HGB BLD-MCNC: 10.4 G/DL (ref 12–16)
IMM GRANULOCYTES # BLD AUTO: 0.08 X10(3)/MCL (ref 0–0.04)
IMM GRANULOCYTES NFR BLD AUTO: 0.5 %
LYMPHOCYTES # BLD AUTO: 1.39 X10(3)/MCL (ref 0.6–4.6)
LYMPHOCYTES NFR BLD AUTO: 9.5 %
MCH RBC QN AUTO: 27.5 PG (ref 27–31)
MCHC RBC AUTO-ENTMCNC: 32.2 G/DL (ref 33–36)
MCV RBC AUTO: 85.4 FL (ref 80–94)
MONOCYTES # BLD AUTO: 0.78 X10(3)/MCL (ref 0.1–1.3)
MONOCYTES NFR BLD AUTO: 5.3 %
NEUTROPHILS # BLD AUTO: 12.34 X10(3)/MCL (ref 2.1–9.2)
NEUTROPHILS NFR BLD AUTO: 84.5 %
NRBC BLD AUTO-RTO: 0 %
PLATELET # BLD AUTO: 84 X10(3)/MCL (ref 130–400)
PLATELETS.RETICULATED NFR BLD AUTO: 10 % (ref 0.9–11.2)
PMV BLD AUTO: 13 FL (ref 7.4–10.4)
RBC # BLD AUTO: 3.71 X10(6)/MCL (ref 4.2–5.4)
WBC # BLD AUTO: 14.62 X10(3)/MCL (ref 4.5–11.5)

## 2024-08-07 PROCEDURE — 63600175 PHARM REV CODE 636 W HCPCS: Performed by: OBSTETRICS & GYNECOLOGY

## 2024-08-07 PROCEDURE — 85025 COMPLETE CBC W/AUTO DIFF WBC: CPT | Performed by: OBSTETRICS & GYNECOLOGY

## 2024-08-07 PROCEDURE — 11000001 HC ACUTE MED/SURG PRIVATE ROOM

## 2024-08-07 PROCEDURE — 85018 HEMOGLOBIN: CPT | Performed by: OBSTETRICS & GYNECOLOGY

## 2024-08-07 PROCEDURE — 85014 HEMATOCRIT: CPT | Performed by: OBSTETRICS & GYNECOLOGY

## 2024-08-07 PROCEDURE — 25000003 PHARM REV CODE 250: Performed by: OBSTETRICS & GYNECOLOGY

## 2024-08-07 PROCEDURE — 36415 COLL VENOUS BLD VENIPUNCTURE: CPT | Performed by: OBSTETRICS & GYNECOLOGY

## 2024-08-07 RX ORDER — IBUPROFEN 600 MG/1
600 TABLET ORAL EVERY 6 HOURS
Qty: 60 TABLET | Refills: 2 | Status: SHIPPED | OUTPATIENT
Start: 2024-08-07

## 2024-08-07 RX ORDER — OXYCODONE AND ACETAMINOPHEN 5; 325 MG/1; MG/1
1 TABLET ORAL EVERY 4 HOURS PRN
Qty: 25 TABLET | Refills: 0 | Status: SHIPPED | OUTPATIENT
Start: 2024-08-07

## 2024-08-07 RX ADMIN — OXYCODONE HYDROCHLORIDE AND ACETAMINOPHEN 1 TABLET: 10; 325 TABLET ORAL at 10:08

## 2024-08-07 RX ADMIN — KETOROLAC TROMETHAMINE 30 MG: 30 INJECTION, SOLUTION INTRAMUSCULAR at 02:08

## 2024-08-07 RX ADMIN — KETOROLAC TROMETHAMINE 30 MG: 30 INJECTION, SOLUTION INTRAMUSCULAR at 07:08

## 2024-08-07 RX ADMIN — OXYCODONE HYDROCHLORIDE AND ACETAMINOPHEN 1 TABLET: 5; 325 TABLET ORAL at 11:08

## 2024-08-07 RX ADMIN — IBUPROFEN 600 MG: 600 TABLET, FILM COATED ORAL at 02:08

## 2024-08-07 RX ADMIN — OXYCODONE HYDROCHLORIDE AND ACETAMINOPHEN 1 TABLET: 10; 325 TABLET ORAL at 04:08

## 2024-08-07 RX ADMIN — DOCUSATE SODIUM 200 MG: 100 CAPSULE, LIQUID FILLED ORAL at 07:08

## 2024-08-07 RX ADMIN — IBUPROFEN 600 MG: 600 TABLET, FILM COATED ORAL at 07:08

## 2024-08-07 NOTE — PROGRESS NOTES
08/06/24 1713   Uterine Activity (PeriWATCH)   Monitor Mode Internal [facility]   Contraction Frequency (min) 4-5   Contraction Durations (sec) 50-80   Contraction Intensity Moderate   Uterine Tone (PeriWATCH)   Resting Tone (palp) Soft   Uterine Characteristics Irregular   Fetal Assessment (PeriWATCH) Baby A   Fetal Monitor Mode Ultrasound   Baseline    Variability Moderate   Rhythm Regular   FA Characteristics Normal     EFM and toco removed from soft, nontender abdomen to roll to OR 3

## 2024-08-08 VITALS
WEIGHT: 206 LBS | HEART RATE: 94 BPM | SYSTOLIC BLOOD PRESSURE: 126 MMHG | HEIGHT: 67 IN | OXYGEN SATURATION: 96 % | BODY MASS INDEX: 32.33 KG/M2 | DIASTOLIC BLOOD PRESSURE: 69 MMHG | RESPIRATION RATE: 18 BRPM | TEMPERATURE: 100 F

## 2024-08-08 LAB
BACTERIA #/AREA URNS AUTO: ABNORMAL /HPF
BASOPHILS # BLD AUTO: 0.02 X10(3)/MCL
BASOPHILS NFR BLD AUTO: 0.1 %
BILIRUB UR QL STRIP.AUTO: NEGATIVE
CLARITY UR: ABNORMAL
COLOR UR AUTO: COLORLESS
EOSINOPHIL # BLD AUTO: 0.03 X10(3)/MCL (ref 0–0.9)
EOSINOPHIL NFR BLD AUTO: 0.2 %
ERYTHROCYTE [DISTWIDTH] IN BLOOD BY AUTOMATED COUNT: 15.5 % (ref 11.5–17)
GLUCOSE UR QL STRIP: NORMAL
HCT VFR BLD AUTO: 34.5 % (ref 37–47)
HGB BLD-MCNC: 11 G/DL (ref 12–16)
HGB UR QL STRIP: ABNORMAL
IMM GRANULOCYTES # BLD AUTO: 0.04 X10(3)/MCL (ref 0–0.04)
IMM GRANULOCYTES NFR BLD AUTO: 0.3 %
KETONES UR QL STRIP: NEGATIVE
LEUKOCYTE ESTERASE UR QL STRIP: 500
LYMPHOCYTES # BLD AUTO: 0.58 X10(3)/MCL (ref 0.6–4.6)
LYMPHOCYTES NFR BLD AUTO: 4.3 %
MCH RBC QN AUTO: 27.2 PG (ref 27–31)
MCHC RBC AUTO-ENTMCNC: 31.9 G/DL (ref 33–36)
MCV RBC AUTO: 85.2 FL (ref 80–94)
MONOCYTES # BLD AUTO: 0.62 X10(3)/MCL (ref 0.1–1.3)
MONOCYTES NFR BLD AUTO: 4.6 %
MUCOUS THREADS URNS QL MICRO: ABNORMAL /LPF
NEUTROPHILS # BLD AUTO: 12.18 X10(3)/MCL (ref 2.1–9.2)
NEUTROPHILS NFR BLD AUTO: 90.5 %
NITRITE UR QL STRIP: NEGATIVE
NRBC BLD AUTO-RTO: 0 %
PH UR STRIP: 6.5 [PH]
PLATELET # BLD AUTO: 105 X10(3)/MCL (ref 130–400)
PLATELETS.RETICULATED NFR BLD AUTO: 5 % (ref 0.9–11.2)
PMV BLD AUTO: 11.9 FL (ref 7.4–10.4)
PROT UR QL STRIP: ABNORMAL
RBC # BLD AUTO: 4.05 X10(6)/MCL (ref 4.2–5.4)
RBC #/AREA URNS AUTO: >100 /HPF
SP GR UR STRIP.AUTO: 1 (ref 1–1.03)
SQUAMOUS #/AREA URNS LPF: ABNORMAL /HPF
UROBILINOGEN UR STRIP-ACNC: NORMAL
WBC # BLD AUTO: 13.47 X10(3)/MCL (ref 4.5–11.5)
WBC #/AREA URNS AUTO: >100 /HPF

## 2024-08-08 PROCEDURE — 87077 CULTURE AEROBIC IDENTIFY: CPT | Performed by: OBSTETRICS & GYNECOLOGY

## 2024-08-08 PROCEDURE — 81001 URINALYSIS AUTO W/SCOPE: CPT | Performed by: OBSTETRICS & GYNECOLOGY

## 2024-08-08 PROCEDURE — 81015 MICROSCOPIC EXAM OF URINE: CPT | Performed by: OBSTETRICS & GYNECOLOGY

## 2024-08-08 PROCEDURE — 72100003 HC LABOR CARE, EA. ADDL. 8 HRS

## 2024-08-08 PROCEDURE — 36415 COLL VENOUS BLD VENIPUNCTURE: CPT | Performed by: OBSTETRICS & GYNECOLOGY

## 2024-08-08 PROCEDURE — 85025 COMPLETE CBC W/AUTO DIFF WBC: CPT | Performed by: OBSTETRICS & GYNECOLOGY

## 2024-08-08 PROCEDURE — 87086 URINE CULTURE/COLONY COUNT: CPT | Performed by: OBSTETRICS & GYNECOLOGY

## 2024-08-08 PROCEDURE — 25000003 PHARM REV CODE 250: Performed by: OBSTETRICS & GYNECOLOGY

## 2024-08-08 RX ADMIN — IBUPROFEN 600 MG: 600 TABLET, FILM COATED ORAL at 08:08

## 2024-08-08 RX ADMIN — DOCUSATE SODIUM 200 MG: 100 CAPSULE, LIQUID FILLED ORAL at 08:08

## 2024-08-08 RX ADMIN — IBUPROFEN 600 MG: 600 TABLET, FILM COATED ORAL at 01:08

## 2024-08-08 NOTE — DISCHARGE SUMMARY
[facility] - 2nd Floor Mother/Baby Unit  Obstetrics  Discharge Summary      Patient Name: Daryl Musa  MRN: [id number]  Admission Date: 2024  Hospital Length of Stay: 3 days  Discharge Date and Time:  2024 8:10 AM  Attending Physician: Singh Marshall MD   Discharging Provider: Singh Marshall MD  Primary Care Provider: Dileep Olson MD    HPI: s/p c/s delivery     Procedure(s) (LRB):   SECTION (N/A)     Hospital Course: normal post op recovery        Final Active Diagnoses:    Diagnosis Date Noted POA    PRINCIPAL PROBLEM:  38 weeks gestation of pregnancy [Z3A.38] 2024 Not Applicable    Delivery by emergency  section [O99.892] 2024 Yes    Thick meconium stained amniotic fluid [P96.83] 2024 Yes    Non-reassuring fetal heart rate with late deceleration [O36.8390] 2024 Yes    - Gestational diabetes mellitus (GDM) in third trimester controlled on oral hypoglycemic drug [O24.415] 2024 Yes    - Thrombocytopenia affecting pregnancy [O99.119, D69.6] 2024 Yes    - H/O LEEP (loop electrosurgical excision procedure) of cervix complicating pregnancy, third trimester [O34.43, Z98.890] 2024 Not Applicable    Factor 5 Leiden mutation, heterozygous [D68.51]  Yes      Problems Resolved During this Admission:        Labs: CBC   Recent Labs   Lab 24  0057 24  0422   WBC  --  14.62*   HGB 10.4* 10.2*   HCT 32.7* 31.7*   PLT  --  84*       Feeding Method: breast    Immunizations       Date Immunization Status Dose Route/Site Given by    24 MMR Incomplete 0.5 mL Subcutaneous/     24 Tdap Incomplete 0.5 mL Intramuscular/             Delivery:    Episiotomy: None   Lacerations: None   Repair suture: None   Repair # of packets:     Blood loss (ml):       Birth information:  YOB: 2026   Time of birth: 5:45 PM   Sex: female   Delivery type: , Low Transverse   Gestational Age: 38w0d  "    Measurements    Weight: 3190 g  Weight (lbs): 7 lb 0.5 oz  Length: 52.1 cm  Length (in): 20.5"  Head circumference: 35.6 cm         Delivery Clinician: Delivery Providers    Delivering clinician: Singh Marshall MD   Provider Role    Isabella Morales RN Delivery Nurse             Additional  information:  Forceps:    Vacuum:    Breech:    Observed anomalies      Living?:     Apgars    Living status: Living  Apgar Component Scores:  1 min.:  5 min.:  10 min.:  15 min.:  20 min.:    Skin color:  0  1       Heart rate:  2  2       Reflex irritability:  2  2       Muscle tone:  2  2       Respiratory effort:  2  2       Total:  8  9       Apgars assigned by: ANASTACIO LOVETT RN         Placenta: Delivered:       appearance  Pending Diagnostic Studies:       Procedure Component Value Units Date/Time    CBC Auto Differential [[id number]]     Order Status: Sent Lab Status: No result     Specimen: Blood     Narrative:      The following orders were created for panel order CBC Auto Differential.  Procedure                               Abnormality         Status                     ---------                               -----------         ------                     CBC with Differential[[id number]]                                                        Please view results for these tests on the individual orders.    CBC with Differential [[id number]]     Order Status: Sent Lab Status: No result     Specimen: Blood     Urinalysis, Reflex to Urine Culture [[id number]]     Order Status: Sent Lab Status: No result     Specimen: Urine             Discharged Condition: good    Disposition: Home or Self Care    Follow Up:    Patient Instructions:   No discharge procedures on file.  Medications:  Current Discharge Medication List        START taking these medications    Details   ibuprofen (ADVIL,MOTRIN) 600 MG tablet Take 1 tablet (600 mg total) by mouth every 6 (six) hours.  Qty: 60 tablet, Refills: 2    "   oxyCODONE-acetaminophen (PERCOCET) 5-325 mg per tablet Take 1 tablet by mouth every 4 (four) hours as needed.  Qty: 25 tablet, Refills: 0           CONTINUE these medications which have NOT CHANGED    Details   cetirizine (ZYRTEC) 10 MG tablet Take 10 mg by mouth once daily.      prenatal vit no.124/iron/folic (PRENATAL VITAMIN ORAL) Take by mouth once daily.      sertraline (ZOLOFT) 50 MG tablet TAKE 1 TABLET BY MOUTH EVERY DAY  Qty: 90 tablet, Refills: 0    Associated Diagnoses: Depression, unspecified depression type           STOP taking these medications       enoxaparin (LOVENOX) 40 mg/0.4 mL Syrg Comments:   Reason for Stopping:         insulin glargine U-100, Lantus, (LANTUS SOLOSTAR U-100 INSULIN) 100 unit/mL (3 mL) InPn pen Comments:   Reason for Stopping:         ACCU-CHEK GUIDE TEST STRIPS Strp Comments:   Reason for Stopping:         ACCU-CHEK SOFTCLIX LANCETS Misc Comments:   Reason for Stopping:         metFORMIN (GLUCOPHAGE) 500 MG tablet Comments:   Reason for Stopping:               Singh Marshall MD  Obstetrics  [facility] - 2nd Floor Mother/Baby Unit

## 2024-08-08 NOTE — PLAN OF CARE
Problem: Adult Inpatient Plan of Care  Goal: Absence of Hospital-Acquired Illness or Injury  Outcome: Progressing     Problem: Diabetes in Pregnancy  Goal: Optimal Coping  Outcome: Progressing     Problem: Diabetes in Pregnancy  Goal: Blood Glucose Level Within Targeted Range  Outcome: Progressing     Problem:  Fall Injury Risk  Goal: Absence of Fall, Infant Drop and Related Injury  Outcome: Progressing

## 2024-08-09 LAB
ABO + RH BLD: NORMAL
ABO + RH BLD: NORMAL
BLD PROD TYP BPU: NORMAL
BLD PROD TYP BPU: NORMAL
BLOOD UNIT EXPIRATION DATE: NORMAL
BLOOD UNIT EXPIRATION DATE: NORMAL
BLOOD UNIT TYPE CODE: 7300
BLOOD UNIT TYPE CODE: 7300
CROSSMATCH INTERPRETATION: NORMAL
CROSSMATCH INTERPRETATION: NORMAL
DISPENSE STATUS: NORMAL
DISPENSE STATUS: NORMAL
UNIT NUMBER: NORMAL
UNIT NUMBER: NORMAL

## 2024-08-10 LAB — BACTERIA UR CULT: ABNORMAL

## 2024-08-11 ENCOUNTER — PATIENT MESSAGE (OUTPATIENT)
Dept: ADMINISTRATIVE | Facility: OTHER | Age: 34
End: 2024-08-11
Payer: COMMERCIAL

## 2024-08-27 ENCOUNTER — PATIENT MESSAGE (OUTPATIENT)
Dept: MATERNAL FETAL MEDICINE | Facility: CLINIC | Age: 34
End: 2024-08-27
Payer: COMMERCIAL

## 2024-08-30 ENCOUNTER — TELEPHONE (OUTPATIENT)
Dept: HEMATOLOGY/ONCOLOGY | Facility: CLINIC | Age: 34
End: 2024-08-30
Payer: COMMERCIAL

## 2024-09-04 DIAGNOSIS — F32.A DEPRESSION, UNSPECIFIED DEPRESSION TYPE: ICD-10-CM

## 2024-09-04 RX ORDER — SERTRALINE HYDROCHLORIDE 50 MG/1
50 TABLET, FILM COATED ORAL DAILY
Qty: 90 TABLET | Refills: 0 | Status: SHIPPED | OUTPATIENT
Start: 2024-09-04

## 2024-12-04 DIAGNOSIS — F32.A DEPRESSION, UNSPECIFIED DEPRESSION TYPE: ICD-10-CM

## 2024-12-04 RX ORDER — SERTRALINE HYDROCHLORIDE 50 MG/1
50 TABLET, FILM COATED ORAL
Qty: 90 TABLET | Refills: 0 | Status: SHIPPED | OUTPATIENT
Start: 2024-12-04

## 2025-03-07 DIAGNOSIS — F32.A DEPRESSION, UNSPECIFIED DEPRESSION TYPE: ICD-10-CM

## 2025-03-07 RX ORDER — SERTRALINE HYDROCHLORIDE 50 MG/1
50 TABLET, FILM COATED ORAL
Qty: 90 TABLET | Refills: 0 | Status: SHIPPED | OUTPATIENT
Start: 2025-03-07

## 2025-06-03 DIAGNOSIS — F32.A DEPRESSION, UNSPECIFIED DEPRESSION TYPE: ICD-10-CM

## 2025-06-03 RX ORDER — SERTRALINE HYDROCHLORIDE 50 MG/1
50 TABLET, FILM COATED ORAL
Qty: 90 TABLET | Refills: 0 | Status: SHIPPED | OUTPATIENT
Start: 2025-06-03

## 2025-07-14 PROBLEM — Z3A.38 38 WEEKS GESTATION OF PREGNANCY: Status: RESOLVED | Noted: 2024-08-06 | Resolved: 2025-07-14

## 2025-08-28 DIAGNOSIS — F32.A DEPRESSION, UNSPECIFIED DEPRESSION TYPE: ICD-10-CM

## 2025-08-28 RX ORDER — SERTRALINE HYDROCHLORIDE 50 MG/1
50 TABLET, FILM COATED ORAL
Qty: 90 TABLET | Refills: 0 | Status: SHIPPED | OUTPATIENT
Start: 2025-08-28

## 2025-09-02 DIAGNOSIS — Z00.00 ROUTINE MEDICAL EXAM: Primary | ICD-10-CM
